# Patient Record
Sex: FEMALE | Race: WHITE | HISPANIC OR LATINO | Employment: FULL TIME | URBAN - METROPOLITAN AREA
[De-identification: names, ages, dates, MRNs, and addresses within clinical notes are randomized per-mention and may not be internally consistent; named-entity substitution may affect disease eponyms.]

---

## 2017-01-17 ENCOUNTER — GENERIC CONVERSION - ENCOUNTER (OUTPATIENT)
Dept: OTHER | Facility: OTHER | Age: 27
End: 2017-01-17

## 2017-01-17 ENCOUNTER — ALLSCRIPTS OFFICE VISIT (OUTPATIENT)
Dept: PERINATAL CARE | Facility: CLINIC | Age: 27
End: 2017-01-17
Payer: COMMERCIAL

## 2017-01-17 PROCEDURE — 76815 OB US LIMITED FETUS(S): CPT | Performed by: OBSTETRICS & GYNECOLOGY

## 2017-01-19 ENCOUNTER — ALLSCRIPTS OFFICE VISIT (OUTPATIENT)
Dept: OTHER | Facility: OTHER | Age: 27
End: 2017-01-19

## 2017-01-20 ENCOUNTER — ANESTHESIA (OUTPATIENT)
Dept: PERIOP | Facility: HOSPITAL | Age: 27
End: 2017-01-20
Payer: COMMERCIAL

## 2017-01-20 ENCOUNTER — HOSPITAL ENCOUNTER (OUTPATIENT)
Facility: HOSPITAL | Age: 27
Setting detail: OUTPATIENT SURGERY
Discharge: HOME | End: 2017-01-20
Attending: OBSTETRICS & GYNECOLOGY | Admitting: OBSTETRICS & GYNECOLOGY
Payer: COMMERCIAL

## 2017-01-20 ENCOUNTER — ANESTHESIA EVENT (OUTPATIENT)
Dept: PERIOP | Facility: HOSPITAL | Age: 27
End: 2017-01-20
Payer: COMMERCIAL

## 2017-01-20 VITALS
HEART RATE: 84 BPM | SYSTOLIC BLOOD PRESSURE: 102 MMHG | BODY MASS INDEX: 33.65 KG/M2 | DIASTOLIC BLOOD PRESSURE: 53 MMHG | HEIGHT: 68 IN | RESPIRATION RATE: 18 BRPM | OXYGEN SATURATION: 99 % | TEMPERATURE: 100.3 F | WEIGHT: 222 LBS

## 2017-01-20 LAB
ABO GROUP BLD: NORMAL
BASOPHILS # BLD AUTO: 0.01 THOUSANDS/ΜL (ref 0–0.1)
BASOPHILS NFR BLD AUTO: 0 % (ref 0–1)
BLD GP AB SCN SERPL QL: NEGATIVE
EOSINOPHIL # BLD AUTO: 0.14 THOUSAND/ΜL (ref 0–0.61)
EOSINOPHIL NFR BLD AUTO: 2 % (ref 0–6)
ERYTHROCYTE [DISTWIDTH] IN BLOOD BY AUTOMATED COUNT: 15.3 % (ref 11.6–15.1)
HCT VFR BLD AUTO: 37.2 % (ref 34.8–46.1)
HGB BLD-MCNC: 12.5 G/DL (ref 11.5–15.4)
LYMPHOCYTES # BLD AUTO: 2.15 THOUSANDS/ΜL (ref 0.6–4.47)
LYMPHOCYTES NFR BLD AUTO: 25 % (ref 14–44)
MCH RBC QN AUTO: 29.6 PG (ref 26.8–34.3)
MCHC RBC AUTO-ENTMCNC: 33.6 G/DL (ref 31.4–37.4)
MCV RBC AUTO: 88 FL (ref 82–98)
MONOCYTES # BLD AUTO: 0.49 THOUSAND/ΜL (ref 0.17–1.22)
MONOCYTES NFR BLD AUTO: 6 % (ref 4–12)
NEUTROPHILS # BLD AUTO: 5.85 THOUSANDS/ΜL (ref 1.85–7.62)
NEUTS SEG NFR BLD AUTO: 67 % (ref 43–75)
NRBC BLD AUTO-RTO: 0 /100 WBCS
PLATELET # BLD AUTO: 225 THOUSANDS/UL (ref 149–390)
PMV BLD AUTO: 10.3 FL (ref 8.9–12.7)
RBC # BLD AUTO: 4.22 MILLION/UL (ref 3.81–5.12)
RH BLD: POSITIVE
WBC # BLD AUTO: 8.65 THOUSAND/UL (ref 4.31–10.16)

## 2017-01-20 PROCEDURE — 86900 BLOOD TYPING SEROLOGIC ABO: CPT | Performed by: OBSTETRICS & GYNECOLOGY

## 2017-01-20 PROCEDURE — 86901 BLOOD TYPING SEROLOGIC RH(D): CPT | Performed by: OBSTETRICS & GYNECOLOGY

## 2017-01-20 PROCEDURE — 86850 RBC ANTIBODY SCREEN: CPT | Performed by: OBSTETRICS & GYNECOLOGY

## 2017-01-20 PROCEDURE — 86920 COMPATIBILITY TEST SPIN: CPT

## 2017-01-20 PROCEDURE — 88305 TISSUE EXAM BY PATHOLOGIST: CPT | Performed by: OBSTETRICS & GYNECOLOGY

## 2017-01-20 PROCEDURE — 85025 COMPLETE CBC W/AUTO DIFF WBC: CPT | Performed by: OBSTETRICS & GYNECOLOGY

## 2017-01-20 RX ORDER — DOXYCYCLINE HYCLATE 100 MG/1
200 CAPSULE ORAL ONCE
Status: DISCONTINUED | OUTPATIENT
Start: 2017-01-20 | End: 2017-01-20

## 2017-01-20 RX ORDER — DOXYCYCLINE HYCLATE 100 MG/1
100 CAPSULE ORAL ONCE
Status: DISCONTINUED | OUTPATIENT
Start: 2017-01-20 | End: 2017-01-20

## 2017-01-20 RX ORDER — MISOPROSTOL 100 UG/1
TABLET ORAL AS NEEDED
Status: DISCONTINUED | OUTPATIENT
Start: 2017-01-20 | End: 2017-01-20 | Stop reason: HOSPADM

## 2017-01-20 RX ORDER — DOXYCYCLINE HYCLATE 100 MG/1
100 CAPSULE ORAL ONCE
Status: COMPLETED | OUTPATIENT
Start: 2017-01-20 | End: 2017-01-20

## 2017-01-20 RX ORDER — FENTANYL CITRATE/PF 50 MCG/ML
25 SYRINGE (ML) INJECTION
Status: DISCONTINUED | OUTPATIENT
Start: 2017-01-20 | End: 2017-01-20 | Stop reason: HOSPADM

## 2017-01-20 RX ORDER — OXYCODONE HYDROCHLORIDE AND ACETAMINOPHEN 5; 325 MG/1; MG/1
1 TABLET ORAL EVERY 4 HOURS PRN
Status: DISCONTINUED | OUTPATIENT
Start: 2017-01-20 | End: 2017-01-20 | Stop reason: HOSPADM

## 2017-01-20 RX ORDER — DOXYCYCLINE HYCLATE 100 MG/1
100 CAPSULE ORAL EVERY 12 HOURS SCHEDULED
Status: DISCONTINUED | OUTPATIENT
Start: 2017-01-20 | End: 2017-01-20 | Stop reason: SDUPTHER

## 2017-01-20 RX ORDER — MIDAZOLAM HYDROCHLORIDE 1 MG/ML
INJECTION INTRAMUSCULAR; INTRAVENOUS AS NEEDED
Status: DISCONTINUED | OUTPATIENT
Start: 2017-01-20 | End: 2017-01-20 | Stop reason: SURG

## 2017-01-20 RX ORDER — SODIUM CHLORIDE, SODIUM LACTATE, POTASSIUM CHLORIDE, CALCIUM CHLORIDE 600; 310; 30; 20 MG/100ML; MG/100ML; MG/100ML; MG/100ML
125 INJECTION, SOLUTION INTRAVENOUS CONTINUOUS
Status: DISCONTINUED | OUTPATIENT
Start: 2017-01-20 | End: 2017-01-20 | Stop reason: HOSPADM

## 2017-01-20 RX ORDER — PROPOFOL 10 MG/ML
INJECTION, EMULSION INTRAVENOUS AS NEEDED
Status: DISCONTINUED | OUTPATIENT
Start: 2017-01-20 | End: 2017-01-20 | Stop reason: SURG

## 2017-01-20 RX ORDER — IBUPROFEN 600 MG/1
600 TABLET ORAL EVERY 6 HOURS PRN
Status: DISCONTINUED | OUTPATIENT
Start: 2017-01-20 | End: 2017-01-20 | Stop reason: HOSPADM

## 2017-01-20 RX ORDER — DIPHENHYDRAMINE HYDROCHLORIDE 50 MG/ML
12.5 INJECTION INTRAMUSCULAR; INTRAVENOUS
Status: DISCONTINUED | OUTPATIENT
Start: 2017-01-20 | End: 2017-01-20 | Stop reason: HOSPADM

## 2017-01-20 RX ORDER — LIDOCAINE HYDROCHLORIDE 10 MG/ML
INJECTION, SOLUTION INFILTRATION; PERINEURAL AS NEEDED
Status: DISCONTINUED | OUTPATIENT
Start: 2017-01-20 | End: 2017-01-20 | Stop reason: SURG

## 2017-01-20 RX ORDER — ACETAMINOPHEN 325 MG/1
650 TABLET ORAL EVERY 6 HOURS PRN
Status: DISCONTINUED | OUTPATIENT
Start: 2017-01-20 | End: 2017-01-20 | Stop reason: HOSPADM

## 2017-01-20 RX ORDER — DOXYCYCLINE HYCLATE 100 MG/1
200 CAPSULE ORAL ONCE
Status: COMPLETED | OUTPATIENT
Start: 2017-01-20 | End: 2017-01-20

## 2017-01-20 RX ORDER — KETOROLAC TROMETHAMINE 30 MG/ML
INJECTION, SOLUTION INTRAMUSCULAR; INTRAVENOUS AS NEEDED
Status: DISCONTINUED | OUTPATIENT
Start: 2017-01-20 | End: 2017-01-20 | Stop reason: SURG

## 2017-01-20 RX ORDER — ONDANSETRON 2 MG/ML
INJECTION INTRAMUSCULAR; INTRAVENOUS AS NEEDED
Status: DISCONTINUED | OUTPATIENT
Start: 2017-01-20 | End: 2017-01-20 | Stop reason: SURG

## 2017-01-20 RX ORDER — ONDANSETRON 2 MG/ML
4 INJECTION INTRAMUSCULAR; INTRAVENOUS ONCE
Status: DISCONTINUED | OUTPATIENT
Start: 2017-01-20 | End: 2017-01-20 | Stop reason: HOSPADM

## 2017-01-20 RX ORDER — FENTANYL CITRATE 50 UG/ML
INJECTION, SOLUTION INTRAMUSCULAR; INTRAVENOUS AS NEEDED
Status: DISCONTINUED | OUTPATIENT
Start: 2017-01-20 | End: 2017-01-20 | Stop reason: SURG

## 2017-01-20 RX ORDER — OXYCODONE HYDROCHLORIDE AND ACETAMINOPHEN 5; 325 MG/1; MG/1
2 TABLET ORAL EVERY 4 HOURS PRN
Status: DISCONTINUED | OUTPATIENT
Start: 2017-01-20 | End: 2017-01-20 | Stop reason: HOSPADM

## 2017-01-20 RX ORDER — LORAZEPAM 2 MG/ML
0.5 INJECTION INTRAMUSCULAR
Status: DISCONTINUED | OUTPATIENT
Start: 2017-01-20 | End: 2017-01-20 | Stop reason: HOSPADM

## 2017-01-20 RX ORDER — ONDANSETRON 2 MG/ML
4 INJECTION INTRAMUSCULAR; INTRAVENOUS
Status: DISCONTINUED | OUTPATIENT
Start: 2017-01-20 | End: 2017-01-20 | Stop reason: HOSPADM

## 2017-01-20 RX ORDER — ONDANSETRON 2 MG/ML
4 INJECTION INTRAMUSCULAR; INTRAVENOUS EVERY 6 HOURS PRN
Status: DISCONTINUED | OUTPATIENT
Start: 2017-01-20 | End: 2017-01-20 | Stop reason: HOSPADM

## 2017-01-20 RX ADMIN — SODIUM CHLORIDE, SODIUM LACTATE, POTASSIUM CHLORIDE, AND CALCIUM CHLORIDE 125 ML/HR: .6; .31; .03; .02 INJECTION, SOLUTION INTRAVENOUS at 08:29

## 2017-01-20 RX ADMIN — SODIUM CHLORIDE, SODIUM LACTATE, POTASSIUM CHLORIDE, AND CALCIUM CHLORIDE 125 ML/HR: .6; .31; .03; .02 INJECTION, SOLUTION INTRAVENOUS at 10:44

## 2017-01-20 RX ADMIN — MIDAZOLAM HYDROCHLORIDE 2 MG: 1 INJECTION, SOLUTION INTRAMUSCULAR; INTRAVENOUS at 08:43

## 2017-01-20 RX ADMIN — DOXYCYCLINE HYCLATE 100 MG: 100 CAPSULE, GELATIN COATED ORAL at 08:13

## 2017-01-20 RX ADMIN — FENTANYL CITRATE 25 MCG: 50 INJECTION INTRAMUSCULAR; INTRAVENOUS at 09:47

## 2017-01-20 RX ADMIN — ONDANSETRON 4 MG: 2 INJECTION INTRAMUSCULAR; INTRAVENOUS at 09:08

## 2017-01-20 RX ADMIN — OXYTOCIN 10 UNITS: 10 INJECTION, SOLUTION INTRAMUSCULAR; INTRAVENOUS at 09:17

## 2017-01-20 RX ADMIN — FENTANYL CITRATE 25 MCG: 50 INJECTION INTRAMUSCULAR; INTRAVENOUS at 09:58

## 2017-01-20 RX ADMIN — KETOROLAC TROMETHAMINE 30 MG: 30 INJECTION, SOLUTION INTRAMUSCULAR at 09:09

## 2017-01-20 RX ADMIN — FENTANYL CITRATE 50 MCG: 50 INJECTION, SOLUTION INTRAMUSCULAR; INTRAVENOUS at 09:00

## 2017-01-20 RX ADMIN — DOXYCYCLINE HYCLATE 200 MG: 100 CAPSULE, GELATIN COATED ORAL at 12:54

## 2017-01-20 RX ADMIN — ONDANSETRON 4 MG: 2 INJECTION INTRAMUSCULAR; INTRAVENOUS at 10:41

## 2017-01-20 RX ADMIN — FENTANYL CITRATE 50 MCG: 50 INJECTION, SOLUTION INTRAMUSCULAR; INTRAVENOUS at 08:50

## 2017-01-20 RX ADMIN — FENTANYL CITRATE 25 MCG: 50 INJECTION INTRAMUSCULAR; INTRAVENOUS at 09:52

## 2017-01-20 RX ADMIN — DEXAMETHASONE SODIUM PHOSPHATE 10 MG: 10 INJECTION INTRAMUSCULAR; INTRAVENOUS at 08:49

## 2017-01-20 RX ADMIN — OXYCODONE HYDROCHLORIDE AND ACETAMINOPHEN 1 TABLET: 5; 325 TABLET ORAL at 11:42

## 2017-01-20 RX ADMIN — FENTANYL CITRATE 25 MCG: 50 INJECTION INTRAMUSCULAR; INTRAVENOUS at 10:05

## 2017-01-20 RX ADMIN — LIDOCAINE HYDROCHLORIDE 50 MG: 10 INJECTION, SOLUTION INFILTRATION; PERINEURAL at 08:49

## 2017-01-20 RX ADMIN — PROPOFOL 200 MG: 10 INJECTION, EMULSION INTRAVENOUS at 08:49

## 2017-01-20 RX ADMIN — IBUPROFEN 600 MG: 600 TABLET ORAL at 12:55

## 2017-01-23 ENCOUNTER — ALLSCRIPTS OFFICE VISIT (OUTPATIENT)
Dept: OTHER | Facility: OTHER | Age: 27
End: 2017-01-23

## 2017-01-23 LAB
ABO GROUP BLD BPU: NORMAL
ABO GROUP BLD BPU: NORMAL
BPU ID: NORMAL
BPU ID: NORMAL
CROSSMATCH: NORMAL
CROSSMATCH: NORMAL
UNIT DISPENSE STATUS: NORMAL
UNIT DISPENSE STATUS: NORMAL
UNIT PRODUCT CODE: NORMAL
UNIT PRODUCT CODE: NORMAL
UNIT RH: NORMAL
UNIT RH: NORMAL

## 2017-01-26 ENCOUNTER — ALLSCRIPTS OFFICE VISIT (OUTPATIENT)
Dept: OTHER | Facility: OTHER | Age: 27
End: 2017-01-26

## 2017-02-14 ENCOUNTER — ALLSCRIPTS OFFICE VISIT (OUTPATIENT)
Dept: OTHER | Facility: OTHER | Age: 27
End: 2017-02-14

## 2017-10-19 ENCOUNTER — APPOINTMENT (EMERGENCY)
Dept: ULTRASOUND IMAGING | Facility: HOSPITAL | Age: 27
End: 2017-10-19
Payer: COMMERCIAL

## 2017-10-19 ENCOUNTER — HOSPITAL ENCOUNTER (EMERGENCY)
Facility: HOSPITAL | Age: 27
Discharge: HOME | End: 2017-10-19
Attending: EMERGENCY MEDICINE
Payer: COMMERCIAL

## 2017-10-19 VITALS
DIASTOLIC BLOOD PRESSURE: 65 MMHG | HEART RATE: 88 BPM | SYSTOLIC BLOOD PRESSURE: 128 MMHG | TEMPERATURE: 97.5 F | OXYGEN SATURATION: 98 % | RESPIRATION RATE: 16 BRPM

## 2017-10-19 DIAGNOSIS — O36.80X0 PREGNANCY OF UNKNOWN ANATOMIC LOCATION: Primary | ICD-10-CM

## 2017-10-19 DIAGNOSIS — O00.202 LEFT OVARIAN PREGNANCY WITHOUT INTRAUTERINE PREGNANCY: ICD-10-CM

## 2017-10-19 PROBLEM — N93.9 VAGINAL BLEEDING: Status: ACTIVE | Noted: 2017-10-19

## 2017-10-19 LAB
ABO GROUP BLD: NORMAL
B-HCG SERPL-ACNC: 521.3 MIU/ML
BACTERIA UR QL AUTO: ABNORMAL /HPF
BASOPHILS # BLD AUTO: 0.01 THOUSANDS/ΜL (ref 0–0.1)
BASOPHILS NFR BLD AUTO: 0 % (ref 0–1)
BILIRUB UR QL STRIP: ABNORMAL
BLD GP AB SCN SERPL QL: NEGATIVE
CHLAMYDIA DNA CVX QL NAA+PROBE: NORMAL
CLARITY UR: ABNORMAL
COLOR UR: ABNORMAL
EOSINOPHIL # BLD AUTO: 0.09 THOUSAND/ΜL (ref 0–0.61)
EOSINOPHIL NFR BLD AUTO: 1 % (ref 0–6)
ERYTHROCYTE [DISTWIDTH] IN BLOOD BY AUTOMATED COUNT: 14.4 % (ref 11.6–15.1)
EXT PREG TEST URINE: NORMAL
GLUCOSE UR STRIP-MCNC: ABNORMAL MG/DL
HCT VFR BLD AUTO: 36.6 % (ref 34.8–46.1)
HGB BLD-MCNC: 12 G/DL (ref 11.5–15.4)
HGB UR QL STRIP.AUTO: ABNORMAL
KETONES UR STRIP-MCNC: ABNORMAL MG/DL
LEUKOCYTE ESTERASE UR QL STRIP: NEGATIVE
LYMPHOCYTES # BLD AUTO: 2.01 THOUSANDS/ΜL (ref 0.6–4.47)
LYMPHOCYTES NFR BLD AUTO: 23 % (ref 14–44)
MCH RBC QN AUTO: 29.1 PG (ref 26.8–34.3)
MCHC RBC AUTO-ENTMCNC: 32.8 G/DL (ref 31.4–37.4)
MCV RBC AUTO: 89 FL (ref 82–98)
MONOCYTES # BLD AUTO: 0.5 THOUSAND/ΜL (ref 0.17–1.22)
MONOCYTES NFR BLD AUTO: 6 % (ref 4–12)
N GONORRHOEA DNA GENITAL QL NAA+PROBE: NORMAL
NEUTROPHILS # BLD AUTO: 6.01 THOUSANDS/ΜL (ref 1.85–7.62)
NEUTS SEG NFR BLD AUTO: 70 % (ref 43–75)
NITRITE UR QL STRIP: NEGATIVE
NON-SQ EPI CELLS URNS QL MICRO: ABNORMAL /HPF
NRBC BLD AUTO-RTO: 0 /100 WBCS
PH UR STRIP.AUTO: 8.5 [PH] (ref 4.5–8)
PLATELET # BLD AUTO: 224 THOUSANDS/UL (ref 149–390)
PMV BLD AUTO: 10.4 FL (ref 8.9–12.7)
PROT UR STRIP-MCNC: ABNORMAL MG/DL
RBC # BLD AUTO: 4.12 MILLION/UL (ref 3.81–5.12)
RBC #/AREA URNS AUTO: ABNORMAL /HPF
RH BLD: POSITIVE
SP GR UR STRIP.AUTO: 1.02 (ref 1–1.03)
SPECIMEN EXPIRATION DATE: NORMAL
UROBILINOGEN UR QL STRIP.AUTO: >=8 E.U./DL
WBC # BLD AUTO: 8.62 THOUSAND/UL (ref 4.31–10.16)
WBC #/AREA URNS AUTO: ABNORMAL /HPF

## 2017-10-19 PROCEDURE — 86901 BLOOD TYPING SEROLOGIC RH(D): CPT | Performed by: EMERGENCY MEDICINE

## 2017-10-19 PROCEDURE — 81001 URINALYSIS AUTO W/SCOPE: CPT

## 2017-10-19 PROCEDURE — 87491 CHLMYD TRACH DNA AMP PROBE: CPT | Performed by: EMERGENCY MEDICINE

## 2017-10-19 PROCEDURE — 85025 COMPLETE CBC W/AUTO DIFF WBC: CPT | Performed by: EMERGENCY MEDICINE

## 2017-10-19 PROCEDURE — 36415 COLL VENOUS BLD VENIPUNCTURE: CPT | Performed by: EMERGENCY MEDICINE

## 2017-10-19 PROCEDURE — 86850 RBC ANTIBODY SCREEN: CPT | Performed by: EMERGENCY MEDICINE

## 2017-10-19 PROCEDURE — 76815 OB US LIMITED FETUS(S): CPT

## 2017-10-19 PROCEDURE — 86900 BLOOD TYPING SEROLOGIC ABO: CPT | Performed by: EMERGENCY MEDICINE

## 2017-10-19 PROCEDURE — 87591 N.GONORRHOEAE DNA AMP PROB: CPT | Performed by: EMERGENCY MEDICINE

## 2017-10-19 PROCEDURE — 81002 URINALYSIS NONAUTO W/O SCOPE: CPT | Performed by: EMERGENCY MEDICINE

## 2017-10-19 PROCEDURE — 81025 URINE PREGNANCY TEST: CPT | Performed by: EMERGENCY MEDICINE

## 2017-10-19 PROCEDURE — 84702 CHORIONIC GONADOTROPIN TEST: CPT | Performed by: EMERGENCY MEDICINE

## 2017-10-19 PROCEDURE — 99284 EMERGENCY DEPT VISIT MOD MDM: CPT

## 2017-10-19 NOTE — DISCHARGE INSTRUCTIONS
Please return to [facility]  ED to have an HCG quant drawn  Please refrain from eating anything after midnight on Saturday  Precautions/Reasons to return to the ED:   -- Severe abdominal pain  -- Heavy vaginal bleeding, saturating more than 1-2 pads per hour  -- Loss of consciousness, dizziness, lightheadness    For information regarding terminating pregancy:  Belen Jay Jorgensen  Phone: [phone]  Address: [address] [place] [address] [address]  [address]    Ectopic Pregnancy   WHAT YOU NEED TO KNOW:   What is an ectopic pregnancy? Ectopic pregnancy occurs when a fertilized egg attaches and begins to grow outside of the uterus  The most common place for this to happen is in the fallopian tube  This is sometimes called a tubal pregnancy  The egg can also implant on the outside of the uterus, on the ovary or cervix, or in the abdomen  The egg may begin to grow, but the pregnancy cannot continue normally  Ectopic pregnancy can cause heavy bleeding and may be life-threatening  What increases my risk of an ectopic pregnancy? · Pelvic inflammatory disease (PID) or infections, such as chlamydia    · Prior ectopic pregnancy     · Getting pregnant when you have an intrauterine device (IUD)     · Previous surgery in your abdomen or on your reproductive organs    · Medicines to treat infertility or that contain female hormones    · Smoking  What are the signs and symptoms of ectopic pregnancy? You may miss your period  You may have one or more of the following:  · One-sided abdominal or pelvic pain and cramping    · Vaginal bleeding or spotting that happens about 7 weeks after your missed period    · Nausea or vomiting    · Dizziness, weakness, or fainting    · Tissue coming out of your vagina  How is ectopic pregnancy diagnosed? Your healthcare provider will examine you and ask about other medical conditions or surgeries you have had   He will ask about pregnancies, miscarriages, infertility treatments, and sexually transmitted infections (STIs) you have had before  You may need any of the following:  · Pelvic exam:  Your healthcare provider will check the size and shape of your uterus, cervix, and ovaries  · Blood and urine tests: These tests can show if you are currently pregnant, or if you have infections or other problems  · Ultrasound: This uses sound waves to show pictures of the inside of your uterus, ovaries, and abdomen  An ultrasound is usually done over your abdomen, but you may also need a vaginal ultrasound  During a vaginal ultrasound, a small tube is placed into your vagina  This can help healthcare providers see areas that may be hard to see during an abdominal ultrasound  How is ectopic pregnancy treated? Your body may absorb the pregnancy tissues and your symptoms may decrease without any treatment  If this does not happen, you may need any of the following:  · Medicines:  Methotrexate or another medicine may be given to stop the pregnancy  This may be given as an injection  You may need more than one dose of this medicine  · Surgery: This may be done to repair or remove tissue or ruptured fallopian tubes  What are the risks of an ectopic pregnancy? Surgery may cause bleeding or infection  Even after treatment, you may have another ectopic pregnancy or have difficulty getting pregnant in the future  If left untreated, parts of your reproductive system or other organs may get damaged  This may cause infection or severe bleeding, and may become life-threatening  Where can I get more information? · [employer]   [facility]  [address]  Phone: [phone]  Phone: [phone]  Web Address: [email or web address]  [email or web address]  When should I contact my healthcare provider? · You have a fever  · You have questions or concerns about your condition or care  When should I seek immediate care or call 1? · You feel lightheaded or like you are going to faint  · You have increasing abdominal or pelvic pain or heavy vaginal bleeding  · You have shoulder pain  · You have chest pain or trouble breathing  CARE AGREEMENT:   You have the right to help plan your care  Learn about your health condition and how it may be treated  Discuss treatment options with your caregivers to decide what care you want to receive  You always have the right to refuse treatment  The above information is an  only  It is not intended as medical advice for individual conditions or treatments  Talk to your doctor, nurse or pharmacist before following any medical regimen to see if it is safe and effective for you  2017 Chad  Information is for End User's use only and may not be sold, redistributed or otherwise used for commercial purposes  All illustrations and images included in CareNotes® are the copyrighted property of [employer]  or Marco Rivero  Threatened Miscarriage   WHAT YOU NEED TO KNOW:   A threatened miscarriage occurs when you have vaginal bleeding within the first 20 weeks of pregnancy  It means that a miscarriage may happen  A threatened miscarriage may also be called a threatened   DISCHARGE INSTRUCTIONS:   Return to the emergency department if:   · You feel weak or faint  · Your pain or cramping in your abdomen or back gets worse  · You have vaginal bleeding that soaks 1 or more pads in an hour  · You pass material that looks like tissue or large clots  Contact your healthcare provider or obstetrician if:   · You have a fever  · You have trouble urinating, burning when you urinate, or feel a need to urinate often  · You have new or worsening vaginal bleeding  · You have vaginal pain or itching, or vaginal discharge that is yellow, green, or foul-smelling       · You have questions or concerns about your condition or care   Self-care: The following may help you manage your symptoms and decrease your risk for a miscarriage:  · Do not put anything in your vagina  Do not have sex, douche, or use tampons  These actions may increase your risk for infection and miscarriage  · Rest as directed  Do not exercise or do strenuous activities  These activities may cause  labor or miscarriage  Ask your healthcare provider what activities are okay to do  Stay healthy during pregnancy:   · Eat a variety of healthy foods  Healthy foods can help you get extra protein, water, and calories that you need while you are pregnant  Healthy foods include fruits, vegetables, whole-grain breads, low-fat dairy products, beans, lean meats, and fish  Avoid raw or undercooked meat and fish  Ask your healthcare provider if you need a special diet  · Take prenatal vitamins as directed  These help you get the right amount of vitamins and minerals  They may also decrease the risk of certain birth defects  · Do not drink alcohol or use illegal drugs  These can increase your risk for a miscarriage or harm your baby  · Do not smoke  Nicotine and other chemicals in cigarettes and cigars can harm your baby and cause miscarriage or  labor  Ask your healthcare provider for information if you currently smoke and need help to quit  E-cigarettes or smokeless tobacco still contain nicotine  Do not use these products  · Decrease your risk for an infection  Always wash your hands before eating or preparing meals  Do not spend time with people who are sick  Ask your healthcare provider if you need immunizations such as the flu or hepatitis B vaccine  Immunizations may decrease your risk for infections that could cause a miscarriage  · Manage your medical conditions  Keep your blood pressure and blood sugars under control  Maintain a healthy weight during pregnancy  Follow up with your obstetrician as directed:   You may need to see your obstetrician frequently for ultrasounds or blood tests  Write down your questions so you remember to ask them during your visits  © 2017 [address] Information is for End User's use only and may not be sold, redistributed or otherwise used for commercial purposes  All illustrations and images included in CareNotes® are the copyrighted property of [employer]  or Marco Rivero  The above information is an  only  It is not intended as medical advice for individual conditions or treatments  Talk to your doctor, nurse or pharmacist before following any medical regimen to see if it is safe and effective for you

## 2017-10-19 NOTE — ED PROVIDER NOTES
History  Chief Complaint   Patient presents with    Vaginal Bleeding      had +pregnancy test  Reports passed clot on the   Patient reports bleeding stopped after that and came back on , reports brown colored bleeding with mid/left side abdominal pain  26-year-old female presents to the emergency department complaining of vaginal bleeding and suprapubic to left lower quadrant abdominal pain  This has been ongoing for the last 2 days  Patient states that she had had a period since the end of August   She took a pregnancy test on  and it was positive  She states she then had vaginal bleeding and passed a clot on   The bleeding resolved and then resumed again on   She states that the bleeding is lighter than her usual period  She does have a prior history of miscarriages in the past but no ectopic pregnancies  She has had no fevers or chills, vaginal discharge          History provided by:  Patient   used: No    Pelvic Pain   Severity:  Moderate  Onset quality:  Gradual  Duration:  2 days  Timing:  Constant  Progression:  Waxing and waning  Chronicity:  New  Associated symptoms: abdominal pain    Associated symptoms: no chest pain, no congestion, no cough, no diarrhea, no ear pain, no fatigue, no fever, no headaches, no myalgias, no nausea, no rhinorrhea, no shortness of breath, no sore throat, no vomiting and no wheezing    Vaginal Bleeding   Quality:  Dark red and lighter than menses  Severity:  Moderate  Onset quality:  Gradual  Duration:  7 days  Timing:  Constant  Chronicity:  New  Menstrual history:  Missed period  Possible pregnancy: yes    Context: spontaneously    Associated symptoms: abdominal pain    Associated symptoms: no back pain, no dizziness, no dysuria, no fatigue, no fever, no nausea and no vaginal discharge        None       Past Medical History:   Diagnosis Date    Missed      twin pregnancy       Past Surgical History:   Procedure Laterality Date     SECTION      DILATION AND EVACUATION N/A 2017    Procedure: DILATATION AND EVACUATION (D&E); Surgeon: Cam Sood MD;  Location: [facility];  Service:        No family history on file  I have reviewed and agree with the history as documented  Social History   Substance Use Topics    Smoking status: Former Smoker    Smokeless tobacco: Not on file    Alcohol use No      Comment: Pt  states she drinks 1 drink/week        Review of Systems   Constitutional: Negative  Negative for chills, diaphoresis, fatigue and fever  HENT: Negative  Negative for congestion, ear pain, rhinorrhea and sore throat  Eyes: Negative  Negative for discharge, redness and itching  Respiratory: Negative  Negative for apnea, cough, chest tightness, shortness of breath and wheezing  Cardiovascular: Negative for chest pain, palpitations and leg swelling  Gastrointestinal: Positive for abdominal pain  Negative for abdominal distention, blood in stool, diarrhea, nausea and vomiting  Endocrine: Negative  Genitourinary: Positive for pelvic pain and vaginal bleeding  Negative for dysuria, flank pain, frequency, urgency and vaginal discharge  Musculoskeletal: Negative  Negative for back pain and myalgias  Skin: Negative  Allergic/Immunologic: Negative  Neurological: Negative  Negative for dizziness, syncope, weakness, light-headedness, numbness and headaches  Hematological: Negative  All other systems reviewed and are negative  Physical Exam  ED Triage Vitals [10/19/17 1235]   Temperature Pulse Respirations Blood Pressure SpO2   97 5 °F (36 4 °C) 91 16 130/76 96 %      Temp Source Heart Rate Source Patient Position - Orthostatic VS BP Location FiO2 (%)   Temporal Monitor Sitting Right arm --      Pain Score       5           Physical Exam   Constitutional: She is oriented to person, place, and time   She appears well-developed and well-nourished  Non-toxic appearance  She does not have a sickly appearance  She does not appear ill  No distress  HENT:   Head: Normocephalic and atraumatic  Right Ear: External ear normal    Left Ear: External ear normal    Mouth/Throat: Oropharynx is clear and moist    Eyes: Conjunctivae are normal  Pupils are equal, round, and reactive to light  Right eye exhibits no discharge  Left eye exhibits no discharge  No scleral icterus  Cardiovascular: Normal rate, regular rhythm and normal heart sounds  Exam reveals no gallop and no friction rub  No murmur heard  Pulmonary/Chest: Effort normal and breath sounds normal  No respiratory distress  She has no wheezes  She has no rales  She exhibits no tenderness  Abdominal: Soft  Normal appearance and bowel sounds are normal  She exhibits no distension and no mass  There is tenderness in the suprapubic area and left lower quadrant  There is no rebound and no guarding  No hernia  Neurological: She is alert and oriented to person, place, and time  She has normal reflexes  She exhibits normal muscle tone  Skin: Skin is warm and dry  No rash noted  She is not diaphoretic  No erythema  No pallor  Psychiatric: She has a normal mood and affect  Nursing note and vitals reviewed        ED Medications  Medications - No data to display    Diagnostic Studies  Labs Reviewed   URINE MICROSCOPIC - Abnormal        Result Value Ref Range Status    RBC, UA Innumerable (*) None Seen, 0-5 /hpf Final    Epithelial Cells Field obscured, unable to enumerate (*) None Seen, Occasional /hpf Final    Bacteria, UA Field obscured, unable to enumerate (*) None Seen, Occasional /hpf Final    WBC, UA None Seen  None Seen, 0-5, 5-55, 5-65 /hpf Final   POCT URINALYSIS DIPSTICK - Abnormal    ED URINE MACROSCOPIC - Abnormal     pH, UA 8 5 (*) 4 5 - 8 0 Final    Protein,  (2+) (*) Negative mg/dl Final    Glucose,  (1/10%) (*) Negative mg/dl Final    Ketones, UA 80 (3+) (*) Negative mg/dl Final    Urobilinogen, UA >=8 0 (*) 0 2, 1 0 E U /dl E U /dl Final    Bilirubin, UA Interference- unable to analyze (*) Negative Final    Comment: The dipstick result may be falsely positive do to interfering substances  We recommend reliance upon serum bilirubin, liver & kidney function tests to guide patient care if clinically indicated      Blood, UA Large (*) Negative Final    Color, UA Bloody   Final    Clarity, UA Slightly Cloudy   Final    Leukocytes, UA Negative  Negative Final    Nitrite, UA Negative  Negative Final    Specific Gravity, UA 1 020  1 003 - 1 030 Final    Narrative:     CLINITEK RESULT   CBC AND DIFFERENTIAL - Normal    WBC 8 62  4 31 - 10 16 Thousand/uL Final    RBC 4 12  3 81 - 5 12 Million/uL Final    Hemoglobin 12 0  11 5 - 15 4 g/dL Final    Hematocrit 36 6  34 8 - 46 1 % Final    MCV 89  82 - 98 fL Final    MCH 29 1  26 8 - 34 3 pg Final    MCHC 32 8  31 4 - 37 4 g/dL Final    RDW 14 4  11 6 - 15 1 % Final    MPV 10 4  8 9 - 12 7 fL Final    Platelets 506  636 - 390 Thousands/uL Final    nRBC 0  /100 WBCs Final    Neutrophils Relative 70  43 - 75 % Final    Lymphocytes Relative 23  14 - 44 % Final    Monocytes Relative 6  4 - 12 % Final    Eosinophils Relative 1  0 - 6 % Final    Basophils Relative 0  0 - 1 % Final    Neutrophils Absolute 6 01  1 85 - 7 62 Thousands/µL Final    Lymphocytes Absolute 2 01  0 60 - 4 47 Thousands/µL Final    Monocytes Absolute 0 50  0 17 - 1 22 Thousand/µL Final    Eosinophils Absolute 0 09  0 00 - 0 61 Thousand/µL Final    Basophils Absolute 0 01  0 00 - 0 10 Thousands/µL Final   POCT PREGNANCY, URINE - Normal    EXT PREG TEST UR (Ref: Negative) POSS ( + )   Corrected   CHLAMYDIA /GC AMPLIFIED DNA   HCG, QUANTITATIVE   TYPE AND SCREEN       US pelvis complete    (Results Pending)       Procedures  Procedures      Phone Contacts  ED Phone Contact    ED Course  ED Course                                MDM  Number of Diagnoses or Management Options  Diagnosis management comments: 80-year-old female presents with vaginal bleeding which has been off and on since October 10th  She had a positive home pregnancy test on October 5th  She has now been having lower abdominal pain for the last couple of days  On exam she is awake and alert and in no distress  Pregnancy test here was positive  She does have some tenderness to the suprapubic and left lower quadrant without rebound or guarding  Will do HCG quants, type and screen, will ultrasound to rule out ectatic pregnancy       Amount and/or Complexity of Data Reviewed  Clinical lab tests: ordered and reviewed  Tests in the radiology section of CPT®: ordered and reviewed  Independent visualization of images, tracings, or specimens: yes      CritCare Time    Disposition  Final diagnoses:   None     ED Disposition     None      Follow-up Information    None       Patient's Medications   Discharge Prescriptions    No medications on file     No discharge procedures on file      ED Provider  Electronically Signed by       Matt Guerrier DO  10/20/17 1648

## 2017-10-19 NOTE — CONSULTS
Gyn Consult Note   José Louie 32 y o  female MRN: [id number]  Unit/Bed#: ED 10 Encounter: [id number]      Assessment:  43-year-old  013 with pregnancy of unknown location    Plan:  1  Pregnancy of unknown location   Patient currently hemodynamically stable  This is an undesired pregnancy  The patient is currently comfortable and has not required pain medication while in the emergency room  Her bleeding is like a light period  H/H wnl at 12/36  Rh pos  Discussed with patient that this could be early failing IUP vs early ectopic  Given that she is stable and pain is well-controlled, discussed will need serial b-hcg for more information at this time  Patient instructed to return to the ED on , 10/21, for repeat HCG quant  If HCG quant decreases, it is likely patient is experiencing spontaneous  and we can offer misoprostol for passage of POC  If HCG quant increases appropriately patient could have a viable pregnancy  If this is to occur patient has been given information for [facility] regarding termination  If HCG quant plateaus, patient should be examined and possibly undergo diagnostic laparoscopy to rule out ectopic pregnancy  Patient was instructed to have labs drawn in the morning on  and to refrain from eating after midnight on Saturday in the event that she may need a diagnostic laparoscopy  Patient was given precautions regarding complications of ectopic pregnancy especially regarding rupture of ectopic pregnancy  Precautions include severe abdominal pain, nausea vomiting, loss of consciousness, dizziness, lightheadedness  Patient understands that she should return to the emergency room and call the on-call OBGYN resident if the symptoms were to occur  Pain:  Over-the-counter Tylenol and ibuprofen  Diet:  Regular diet    Dispo:  Discharge home today    Patient is plan was discussed with Dr Ash Kern MD  OBGYN, PGY3  10/19/2017 6:22 PM      Attending note  I have seen the patient and agree with the note with direct modifications made  Briefly, 32 y o  [id number] with early pregnancy of unk location  Hemodynamically stable, low suspicion for intraabdominal bleed  Given low hcg and benign abdominal exam with bleeding, suspect early SAB; however, because of the questionable soft tissue mass in adnexa cannot exclude early ectopic  Emphasized importance of f/u b-hcg in 48 hours  Pt understands and agrees with plan  Phone numbers obtained for both patient and her boyfriend (given permission to discuss medical care with her significant other)  Brooklyn Soto MD    HPI:  Adriel Simpson is a 41-year-old  013 pregnancy of unk gestation (uncertain LMP, maybe 2017) who presented to the emergency department complaining of vaginal bleeding and left lower quadrant abdominal pain  Patient reports vaginal bleeding started on  at which time she passed some small clots  Vaginal bleeding resolved and then resumed on   She states that she is having vaginal bleeding today that is similar to her menstrual periods  Patient reports the pain worsened acutely 2 days ago at which time she was in the fetal position and fell asleep from crying  Patient states that she has been smoking excessive amounts of marijuana in order to help with her pain  When patient woke she stated the pain was resolved and since then has been intermittent and describes the pain as achy and sore  Patient had a positive home pregnancy test on   Her HCG quant today at 13:26 was noted to be 512  Ultrasound was done  No intrauterine pregnancy was visualized  A left adnexal soft tissue mass was noted along with free fluid in her posterior cul-de-sac that was concerning for possible ectopic pregnancy  In discussion regarding pregnancy patient states that this is an unwanted pregnancy   Patient states that she feels as though she is having a miscarriage and her bleeding is similar to miscarriages in the past       OBHx: CS x 1 followed by  x 2,  One likely SAB at home (chemical pregnancy?)    /65   Pulse 88   Temp 97 5 °F (36 4 °C) (Temporal)   Resp 16   LMP 2017 (Exact Date)   SpO2 98%     Physical Exam  Gen: not in acute distress  Alert and oriented  CV: regular rate  Peripheral pulses palpable  Lungs: normal effort of breathing  No tachypnea  Abdomen:  Soft, nondistended  Tender to palpation in left lower quadrant and left inguinal area  :  Bimanual exam performed  Uterus mobile, normal in size and contour  Cervix palpated and closed  Left adnexa tender to palpation abdominally  No masses appreciated bilaterally  No masses appreciated in posterior cul-de-sac  Vaginal bleeding evident on exam, not heavy, no clots observed  Extremities: no edema, nontender      Lab Results   Component Value Date    WBC 8 62 10/19/2017    HGB 12 0 10/19/2017    HCT 36 6 10/19/2017    MCV 89 10/19/2017     10/19/2017       Lab Results   Component Value Date    GLUCOSE 71 2016    CALCIUM 8 9 2016     2016    K 3 6 2016    CO2 25 2016     2016    BUN 6 2016    CREATININE 0 52 (L) 2016     Rh positive    Pelvic US:  PELVIC ULTRASOUND, COMPLETE     INDICATION: Pregnant, bleeding  Left lower quadrant pain                COMPARISON: 2011      TECHNIQUE:   Transabdominal pelvic ultrasound was performed in sagittal and transverse planes with a curvilinear transducer  Additional transvaginal imaging was performed to better evaluate the endometrium and ovaries  Imaging included volumetric   sweeps as well as traditional still imaging technique      FINDINGS:     UTERUS:  The uterus is anteverted in position, measuring 11 0 x 4 2 x 7 5 cm     Contour and echotexture appear normal       The cervix shows no suspicious abnormality      ENDOMETRIUM:    Normal caliber of 4 mm   Homogenous and normal in appearance  No intrauterine pregnancy is identified      OVARIES/ADNEXA:  Right ovary:  3 5 x 1 9 x 1 9 cm  No suspicious right ovarian abnormality  Doppler flow within normal limits      Left ovary: The left ovary has an abnormal appearance with a potential adjacent soft tissue abnormality of measuring 4 2 x 2 8 x 3 2 cm  A moderate amount of complex free fluid is identified within the pelvis  Given these findings a left adnexal ectopic pregnancy is not excluded on the basis of this exam         IMPRESSION:     No intrauterine pregnancy identified  Abnormal appearance of the left ovary and complex pelvic free fluid  Given these findings, the possibility of a left adnexal ectopic pregnancy is not excluded

## 2017-10-22 ENCOUNTER — HOSPITAL ENCOUNTER (EMERGENCY)
Facility: HOSPITAL | Age: 27
Discharge: HOME | End: 2017-10-22
Attending: EMERGENCY MEDICINE
Payer: COMMERCIAL

## 2017-10-22 VITALS
RESPIRATION RATE: 18 BRPM | BODY MASS INDEX: 34.19 KG/M2 | SYSTOLIC BLOOD PRESSURE: 115 MMHG | DIASTOLIC BLOOD PRESSURE: 65 MMHG | WEIGHT: 224.87 LBS | HEART RATE: 100 BPM | OXYGEN SATURATION: 98 % | TEMPERATURE: 98.8 F

## 2017-10-22 DIAGNOSIS — O36.80X0 PREGNANCY OF UNKNOWN ANATOMIC LOCATION: Primary | ICD-10-CM

## 2017-10-22 PROBLEM — O03.9 SPONTANEOUS ABORTION: Status: ACTIVE | Noted: 2017-10-22

## 2017-10-22 LAB — B-HCG SERPL-ACNC: 313.2 MIU/ML

## 2017-10-22 PROCEDURE — 84702 CHORIONIC GONADOTROPIN TEST: CPT | Performed by: EMERGENCY MEDICINE

## 2017-10-22 PROCEDURE — 36415 COLL VENOUS BLD VENIPUNCTURE: CPT | Performed by: EMERGENCY MEDICINE

## 2017-10-22 PROCEDURE — 99283 EMERGENCY DEPT VISIT LOW MDM: CPT

## 2017-10-22 RX ORDER — MISOPROSTOL 200 UG/1
400 TABLET ORAL ONCE
Qty: 1 TABLET | Refills: 0 | Status: SHIPPED | OUTPATIENT
Start: 2017-10-22 | End: 2017-10-22

## 2017-10-22 NOTE — DISCHARGE INSTRUCTIONS
Miscarriage   WHAT YOU NEED TO KNOW:   A miscarriage is the loss of a fetus within the first 20 weeks of pregnancy  A miscarriage may also be called a spontaneous  or an early pregnancy loss  DISCHARGE INSTRUCTIONS:   Seek care immediately if:   · You have foul-smelling drainage or pus coming from your vagina  · You have heavy vaginal bleeding and soak 1 pad or more in an hour  · You have severe abdominal pain  · You feel like your heart is beating faster than normal      · You feel extremely weak or dizzy  Contact your healthcare provider if:   · You have a fever greater than 100 4°F or chills  · You have extreme sadness, grief, or feel unable to cope with what has happened  · You have questions or concerns about your condition or care  Self-care:   · Do not put anything in your vagina for 2 weeks or as directed  Do not use tampons, douche, or have sex  These actions can cause infection and pain  · Use sanitary pads as needed  You may have light bleeding or spotting for 2 weeks  · Do not take a bath or go swimming for 2 weeks or as directed  These actions may increase your risk for an infection  Take showers only  · Rest as needed  Slowly start to do more each day  Return to your daily activities as directed  · Talk to your healthcare provider about birth control  If you would like to prevent another pregnancy, ask your healthcare provider which type of birth control is best for you  · Join a support group or therapy to help you cope  A miscarriage may be very difficult for you, your partner, and other members of your family  There is no right way to feel after a miscarriage  You may feel overwhelming grief or other emotions  It may be helpful to talk to a friend, family member, or counselor about your feelings  You may worry that you could have another miscarriage  Talk to your healthcare provider about your concerns   He may be able to help you reduce the risk for another miscarriage  He may also help you find ways to cope with grief  For more information:   · [employer]  [address] [place] [address] , [address] [place] [address]  Phone: [phone]  Phone: [phone]  Web Address: http://[employer]/  org  · [facility]  [address] [facility] , [address] [place] [address]  Web Address: UNI5  Follow up with your healthcare provider as directed: You may need to see your healthcare provider for blood tests or an ultrasound  Write down your questions so you remember to ask them during your visits  © 2017 [address] [employer]  Information is for End User's use only and may not be sold, redistributed or otherwise used for commercial purposes  All illustrations and images included in CareNotes® are the copyrighted property of [employer]  or [employer]  The above information is an  only  It is not intended as medical advice for individual conditions or treatments  Talk to your doctor, nurse or pharmacist before following any medical regimen to see if it is safe and effective for you

## 2017-10-22 NOTE — H&P
H&P Exam - Gynecology   Alex Grewal 32 y o  female MRN: [id number]  Unit/Bed#: ED 28 Encounter: [id number]    Chief Complaint   Patient presents with    Abdominal Pain Re-Eval     States was seen here x2 days ago for ectopic rule out, told to come back to ED for follow up blood work, states is still having abd and back pain  History of Present Illness     HPI:  Alex Grewal is a 32 y o  female [id number] who was seen in the ED on 10/19/17 for LLQ pain and vaginal bleeding, + HPT  On 10/19 quanitative Bhcg 521, TVUS showed no IUP, notable for left adnexal soft tissue mass and free fluid in the pelvis, hemodynamically stable w/ H&H   She was evaluated by GYN Dr Jose [place] and discharged with follow up in 2 days for evaluation of symptoms and quantitative Bhcg  She presents today reports that she still has mild vaginal bleeding "like a period", not soaking through >1pad/hr, no passage of tissue  LLQ pain has completely resolved, now reports uterine cramping  Denies fever/chills, chest pain, palpitations, SOB  Review of Systems   Constitutional: Negative for chills and fever  Respiratory: Negative for chest tightness and shortness of breath  Cardiovascular: Negative for chest pain and palpitations  Gastrointestinal: Negative for diarrhea, nausea and vomiting  Genitourinary: Positive for vaginal bleeding  Negative for dysuria and flank pain  Neurological: Negative for dizziness and headaches  Historical Information   Past Medical History:   Diagnosis Date    Missed      twin pregnancy     Past Surgical History:   Procedure Laterality Date     SECTION      DILATION AND EVACUATION N/A 2017    Procedure: DILATATION AND EVACUATION (D&E); Surgeon: Albina Pang MD;  Location: [facility];  Service:      OB/GYN History:   C/S x 1,  x 1  SAB x 1    History reviewed  No pertinent family history    Social History   History   Alcohol Use No     Comment: Pt  states she drinks 1 drink/week     History   Drug Use No     History   Smoking Status    Former Smoker   Smokeless Tobacco    Never Used       Meds/Allergies     (Not in a hospital admission)  Allergies   Allergen Reactions    Cat Hair Extract        Objective   Vitals: Blood pressure 115/65, pulse 100, temperature 98 8 °F (37 1 °C), temperature source Oral, resp  rate 18, weight 102 kg (224 lb 13 9 oz), last menstrual period 2017, SpO2 98 %  No intake or output data in the 24 hours ending 10/22/17 1359    Invasive Devices: Invasive Devices          No matching active lines, drains, or airways          Physical Exam   Gen: NAD, AAOx3, Pleasant & Cooperative  Cv: RRR, No M/R/G  Resp: CTAB, No W/R/R  Abd: Soft, nontender, no rebound or guarding, nondistended, bowel sounds +  Gu: Bimanual examination: normal sized & mobile uterus, no CMT, no adnexal tenderness bilaterally  Ext: No edema or tenderness    Lab Results:   Admission on 10/22/2017   Component Date Value    HCG, Quant 10/22/2017 313 2*      Imaging: I have personally reviewed pertinent reports  EKG, Pathology, and Other Studies: I have personally reviewed pertinent reports  Assessment/Plan     Assessment:  31 y/O  w/ SAB  Quant 521--> 313  Vaginal bleeding stable, vitals stable  We discussed expectant management of SAB vs medical management with Cytotec  Patient desires Cytotec  Plan:  Rx for PO Cytotec 400mg  Patient will [facility] up at the [facility] in 1 week for evaluation and repeat quantitative Bhcg  Bleeding & ectopic precautions reviewed    Discussed with Dr Montrell Kern / Coordination of Care  Total floor / unit time spent today30 minutes  minutes  Greater than 50% of total time was spent with the patient and / or family counseling and / or coordination of care       Misti Jolly MD  10/22/2017  1:59 PM

## 2017-10-22 NOTE — ED PROVIDER NOTES
History  Chief Complaint   Patient presents with    Abdominal Pain Re-Eval     States was seen here x2 days ago for ectopic rule out, told to come back to ED for follow up blood work, states is still having abd and back pain   013, unknown LMP (possibly August)  Pt was seen here 3 days ago for an ectopic rule out  Had US that did not confirm IUP and abnormal appearance of the left ovary and complex pelvic free fluid, and ectopic pregnancy could not be excluded  B-quant was 521 on 10/19  Pt was evaluated by OBGYN while in the ER and instructed to return to the ER in 48 hours for a repeat B-quant  Pt did not come in yesterday  Still having the same pain, pt denies worsening pain  Pt states her vaginal bleeding is lighter than what it was when she was here 3 days ago  History provided by:  Patient   used: No    Abdominal Pain   Pain location:  LLQ and suprapubic  Pain quality: cramping    Pain radiates to:  Does not radiate  Timing:  Constant  Progression:  Unchanged  Chronicity:  New  Relieved by:  None tried  Worsened by:  Nothing  Ineffective treatments:  None tried  Associated symptoms: vaginal bleeding    Associated symptoms: no chills, no constipation, no cough, no diarrhea, no dysuria, no fever, no hematuria, no nausea, no vaginal discharge and no vomiting        None       Past Medical History:   Diagnosis Date    Missed      twin pregnancy       Past Surgical History:   Procedure Laterality Date     SECTION      DILATION AND EVACUATION N/A 2017    Procedure: DILATATION AND EVACUATION (D&E); Surgeon: Cam Sood MD;  Location[facility];  Service:        History reviewed  No pertinent family history  I have reviewed and agree with the history as documented      Social History   Substance Use Topics    Smoking status: Former Smoker    Smokeless tobacco: Never Used    Alcohol use No      Comment: Pt  states she drinks 1 drink/week        Review of Systems   Constitutional: Negative for appetite change, chills and fever  HENT: Negative for congestion and rhinorrhea  Respiratory: Negative for cough  Gastrointestinal: Positive for abdominal pain  Negative for abdominal distention, blood in stool, constipation, diarrhea, nausea and vomiting  Genitourinary: Positive for vaginal bleeding  Negative for dysuria, flank pain, frequency, hematuria, urgency and vaginal discharge  Musculoskeletal: Negative for back pain  All other systems reviewed and are negative  Physical Exam  ED Triage Vitals [10/22/17 1113]   Temperature Pulse Respirations Blood Pressure SpO2   98 8 °F (37 1 °C) 99 18 121/70 95 %      Temp Source Heart Rate Source Patient Position - Orthostatic VS BP Location FiO2 (%)   Oral Monitor Sitting Right arm --      Pain Score       6           Physical Exam   Constitutional: She is oriented to person, place, and time  She appears well-developed and well-nourished  Non-toxic appearance  She does not have a sickly appearance  She does not appear ill  No distress  Playing on cell phone   HENT:   Head: Normocephalic  Mouth/Throat: Oropharynx is clear and moist and mucous membranes are normal    Neck: Normal range of motion  Neck supple  Cardiovascular: Normal rate, regular rhythm, normal heart sounds and intact distal pulses  Exam reveals no gallop and no friction rub  No murmur heard  Pulmonary/Chest: Effort normal and breath sounds normal  No respiratory distress  She has no wheezes  She has no rales  Abdominal: Soft  Normal appearance and bowel sounds are normal  She exhibits no distension and no mass  There is no hepatosplenomegaly  There is tenderness in the suprapubic area and left lower quadrant  There is no rigidity, no rebound, no guarding and no CVA tenderness  Lymphadenopathy:     She has no cervical adenopathy  Neurological: She is alert and oriented to person, place, and time     Skin: Skin is warm, dry and intact  No rash noted  No pallor  Nursing note and vitals reviewed  ED Medications  Medications - No data to display    Diagnostic Studies  Labs Reviewed   HCG, QUANTITATIVE - Abnormal        Result Value Ref Range Status    HCG, Quant 313 2 (*) <=6 mIU/mL Final    Narrative:      Expected Ranges:     Approximate               Approximate HCG  Gestation age          Concentration ( mIU/mL)  _____________          ______________________   Marshel Dura                      HCG values  0 2-1                       5-50  1-2                           2-3                         100-5000  3-4                         500-02653  4-5                         1000-67066  5-6                         [id number]  6-8                         [id number]  8-12                        [id number]   CBC AND DIFFERENTIAL       No orders to display       Procedures  Procedures      Phone Contacts  ED Phone Contact    ED Course  ED Course as of Oct 22 1453   Sun Oct 22, 2017   1216 OBESPERANZAN paged    21  Discussed case with OBGYN resident Jonathan Castañeda who will come see pt     5479 Pt seen and discharged by OBGYN  They gave her a rx for repeat B-quant and f/u with [facility] in 1 week  MDM  Number of Diagnoses or Management Options  Diagnosis management comments: Possible ectopic pregnancy - Will order B-quant  Amount and/or Complexity of Data Reviewed  Clinical lab tests: ordered and reviewed      CritCare Time    Disposition  Final diagnoses:   Pregnancy of unknown anatomic location     ED Disposition     ED Disposition Condition Comment    Discharge  [address] discharge to home/self care      Condition at discharge: Good        Follow-up Information     Follow up With Specialties Details Why Madeleine 8977  Schedule an appointment as soon as possible for a visit in 1 week(s)  Stephanie [address]  [phone]          Discharge Medication List as of 10/22/2017  2:43 PM      CONTINUE these medications which have CHANGED    Details   misoprostol (CYTOTEC) 200 mcg tablet Take 2 tablets by mouth once for 1 dose, Starting Sun 10/22/2017, Print           No discharge procedures on file      ED Provider  Electronically Signed by       Jase Valero 24, DO  10/22/17 4952

## 2017-10-23 ENCOUNTER — GENERIC CONVERSION - ENCOUNTER (OUTPATIENT)
Dept: OTHER | Facility: OTHER | Age: 27
End: 2017-10-23

## 2017-11-03 ENCOUNTER — LAB CONVERSION - ENCOUNTER (OUTPATIENT)
Dept: OTHER | Facility: OTHER | Age: 27
End: 2017-11-03

## 2017-11-03 DIAGNOSIS — O02.1 MISSED ABORTION: ICD-10-CM

## 2017-11-27 ENCOUNTER — GENERIC CONVERSION - ENCOUNTER (OUTPATIENT)
Dept: OTHER | Facility: OTHER | Age: 27
End: 2017-11-27

## 2018-01-10 NOTE — MISCELLANEOUS
Message  Called pt as she Treintsammy Y Mayank 2340 to f/u appt  She "forgot"  She is to get her HCG done today and will call [facility] to reschedule  Her bleeding is now just brown discharge and her pain has resolved  Plan  Missed     · (1) HCG QUANT; Status:Active;  Requested SIY:[id number];     Signatures   Electronically signed by : SACHIN Triana ; Nov  3 2017 11:39AM EST                       (Author)

## 2018-01-12 NOTE — PROGRESS NOTES
2017         RE: David Janette                                  To: [facility]   MR#: [id number]                                     [address] [place] [address]   : [address], [address]   ENC: [phone]:[facility]                             Fax: [phone]   (Exam #: [id number])      The LMP of this 32year old,  G4, P3-0-0-3 patient was OCT 12 2016, her   working DEDE is 2017 and the current gestational age is 16 weeks 4   days by [address] [place] [address]  A sonographic examination was performed on 2017 using real time equipment  The ultrasound examination was   performed using abdominal technique  The patient has a BMI of 33 9  Her   blood pressure today was 121/65  Earliest ultrasound found in her record: 2016   9w4d  2017 DEDE   Multiple longitudinal and transverse sections revealed a twin intrauterine   pregnancy  Fetus A   Cardiac motion was not observed  Fetus B   Cardiac motion was not observed  INDICATIONS      first trimester genetic screening   obesity   prior macrosomia   diabetes, gestation-previous pregnancy   previous       Exam Types      Level I   sequential screen      MEASUREMENTS (* Included In Average GA)      FETUS A   CRL              2 5 cm        9 weeks 1 day  *      Fetus A AVERAGE G  A  is 9 weeks 1 day +/- 5 days  FETUS B   CRL              2 4 cm        9 weeks 0 days *      Fetus B AVERAGE G  A  is 9 weeks 0 days +/- 5 days        IMPRESSION      Twin IUP (Fetus A)   9 weeks and 1 day by this ultrasound  (DEDE=AUG 21 2017)   12 weeks and 4 days by 1st Tri Sono  (DEDE=2017)   Heart movement not seen      Twin IUP (Fetus B)   9 weeks and 0 days by this ultrasound  (DEDE=AUG 22 2017)   12 weeks and 4 days by 1st Tri Sono  (DEDE=2017)   Heart movement not seen      GENERAL Patellaura Suzesammy presents today for a maternal-fetal evaluation and genetic screening   on a twin pregnancy  Today's evaluation demonstrates what appears to be a   monochorionic diamniotic twin pregnancy with non-viability of both twins  She had had 2 previous early ultrasounds in the clinic, her latest of   which was on  when she was 9 weeks and 4 days  Both fetuses at   this time are measuring concordant at 9 weeks and one day and 9 week,   respectively without cardiac activity utilizing power Doppler on multiple   angles in order to confirm non-viability of this twin pregnancy  I   discussed today's findings in detail with the patient and the boyfriend  They are very appropriately upset  Dr Castro Felipe also help to coordinate the   patient's follow-up care in the clinic in order to manage this nonviable   first trimester missed   The various options include expectant   management, medical management, and surgical management  All of which are   reasonable options however there may be some increased bleeding given the   twin pregnancy  I discussed with this couple that genetics could be   offered in order to determine etiology but discussed with them that   monochorionic twins have a higher incidence of miscarriage and outside of   a genetic reason, the likely reason would be the multiple gestation  Thank you very much for allowing us to participate in the care of this   very nice patient  Should you have any questions, please do not hesitate   to contact our office  Please note, in addition to the time spent discussing the results of the   ultrasound, I spent approximately 20 minutes of face-to-face time with the   patient, greater than 50% of which was spent in counseling and the   coordination of care for this patient  GEN Rviera M D     Electronically signed 17 14:30

## 2018-01-13 VITALS
BODY MASS INDEX: 35.19 KG/M2 | SYSTOLIC BLOOD PRESSURE: 121 MMHG | WEIGHT: 232.2 LBS | DIASTOLIC BLOOD PRESSURE: 65 MMHG | HEIGHT: 68 IN

## 2018-01-13 VITALS
HEIGHT: 68 IN | HEART RATE: 100 BPM | BODY MASS INDEX: 33.65 KG/M2 | SYSTOLIC BLOOD PRESSURE: 111 MMHG | DIASTOLIC BLOOD PRESSURE: 73 MMHG | WEIGHT: 222 LBS

## 2018-01-13 NOTE — MISCELLANEOUS
Message  spoke to patient on the phone, she was given cytotec, patient to follow up in one week here  transferred to  to schedule an apt      Active Problems    1  Endometritis (615 9) (N71 9)   2  Missed  (69 849 69 22) (O02 1)   3  Postop check (V67 00) (Z09)   4  Twin pregnancy (651 00,V91 00) (O30 009)    Current Meds   1  Doxycycline Hyclate 100 MG Oral Capsule; TAKE 1 CAPSULE EVERY 12 HOURS UNTIL   GONE;   Therapy: [id number] to (Evaluate:[id number]); Last Rx:2017 Ordered   2  Prenatal Vitamins 0 8 MG Oral Tablet; TAKE 1 TABLET DAILY; Therapy: [id number] to (Alex Panda)  Requested for: [id number]; Last   Rx:[id number] Ordered   3  Tylenol TABS; Therapy: (Recorded:2017) to Recorded    Allergies    1  No Known Drug Allergies    2  Animal dander - Cats   3   No Known Food Allergies    Signatures   Electronically signed by : Renetta Santos RN; Oct 23 2017 10:13AM EST                       (Author)

## 2018-01-14 VITALS
BODY MASS INDEX: 33.65 KG/M2 | HEIGHT: 68 IN | WEIGHT: 222 LBS | SYSTOLIC BLOOD PRESSURE: 134 MMHG | DIASTOLIC BLOOD PRESSURE: 80 MMHG

## 2018-01-14 VITALS
BODY MASS INDEX: 34.65 KG/M2 | HEIGHT: 68 IN | DIASTOLIC BLOOD PRESSURE: 80 MMHG | WEIGHT: 228.6 LBS | SYSTOLIC BLOOD PRESSURE: 118 MMHG

## 2018-01-14 VITALS
SYSTOLIC BLOOD PRESSURE: 118 MMHG | WEIGHT: 225 LBS | DIASTOLIC BLOOD PRESSURE: 80 MMHG | HEIGHT: 68 IN | BODY MASS INDEX: 34.1 KG/M2

## 2018-01-23 NOTE — MISCELLANEOUS
Message  I call and spoke with patient today  patient said that she moved to [place] and so can not make her clinic visits or do her quant  However she has no current complaints, and says that she does not believe she is pregnant anymore as she has resume her "regular menses'  Patient plans to establish care in [place] and plans to request her records from the [facility]  Signatures   Electronically signed by :  SACHIN Farris ; Dec 11 2017  1:29PM EST                       (Author)

## 2018-03-07 NOTE — PROGRESS NOTES
Education  [facility] [facility] Education 1st Trimester:   First Trimester Education provided: benefits of breastfeeding, importance of exclusive breastfeeding, early initiation of breastfeeding and exclusive breastfeeding for the first 6 months   The patient is planning on breastfeeding  Prenatal education provided by: Latha Martin Date: 12/2/2016        Signatures   Electronically signed by : Latha Martin, ; Dec  2 2016 11:31AM EST                       (Author)

## 2018-03-19 ENCOUNTER — OFFICE VISIT (OUTPATIENT)
Dept: OBGYN CLINIC | Facility: CLINIC | Age: 28
End: 2018-03-19
Payer: COMMERCIAL

## 2018-03-19 VITALS — DIASTOLIC BLOOD PRESSURE: 71 MMHG | WEIGHT: 226 LBS | BODY MASS INDEX: 34.36 KG/M2 | SYSTOLIC BLOOD PRESSURE: 112 MMHG

## 2018-03-19 DIAGNOSIS — Z30.013 ENCOUNTER FOR INITIAL PRESCRIPTION OF INJECTABLE CONTRACEPTIVE: Primary | ICD-10-CM

## 2018-03-19 PROCEDURE — 99214 OFFICE O/P EST MOD 30 MIN: CPT | Performed by: NURSE PRACTITIONER

## 2018-03-19 RX ORDER — MEDROXYPROGESTERONE ACETATE 150 MG/ML
150 INJECTION, SUSPENSION INTRAMUSCULAR
Qty: 1 ML | Refills: 5 | Status: SHIPPED | OUTPATIENT
Start: 2018-03-19 | End: 2022-02-21 | Stop reason: ALTCHOICE

## 2018-03-19 NOTE — PROGRESS NOTES
Assessment/Plan:         Diagnoses and all orders for this visit:    Encounter for initial prescription of injectable contraceptive  -     medroxyPROGESTERone (DEPO-PROVERA) 150 mg/mL injection; Inject 1 mL (150 mg total) into the shoulder, thigh, or buttocks every 3 (three) months      Plan   Return for Depoprovera when appointment is avaialble  Annual exam and PAP with F/U Depoprovera          Subjective:      atient ID: Judi Stringer is a 32 y o  female  HPI  Pt states she had a miscarriage of Twins in Dec  Only had intercourse since x1 with a condom  New partner  Wants to restart Depo, was on in the past and was happy with method  Discussed irregular bleeding pattern and Calcium and vitamin D intake  Last WNL PAP was 3/11/2015         Review of Systems    Negative today  Denies pain    Objective:      /71   Wt 103 kg (226 lb)   LMP 03/14/2018 (Approximate)   BMI 34 36 kg/m²          Physical Exam    Alert and oriented  Denies pain  Plan to schedule annual GYN exam with F/U Depo

## 2018-03-20 ENCOUNTER — CLINICAL SUPPORT (OUTPATIENT)
Dept: OBGYN CLINIC | Facility: CLINIC | Age: 28
End: 2018-03-20
Payer: COMMERCIAL

## 2018-03-20 DIAGNOSIS — Z30.013 ENCOUNTER FOR INITIAL PRESCRIPTION OF INJECTABLE CONTRACEPTIVE: Primary | ICD-10-CM

## 2018-03-20 LAB — SL AMB POCT URINE HCG: NEGATIVE

## 2018-03-20 PROCEDURE — 81025 URINE PREGNANCY TEST: CPT

## 2018-03-20 PROCEDURE — 96372 THER/PROPH/DIAG INJ SC/IM: CPT

## 2018-03-20 RX ORDER — MEDROXYPROGESTERONE ACETATE 150 MG/ML
150 INJECTION, SUSPENSION INTRAMUSCULAR ONCE
Status: COMPLETED | OUTPATIENT
Start: 2018-03-20 | End: 2018-03-20

## 2018-03-20 RX ORDER — MEDROXYPROGESTERONE ACETATE 150 MG/ML
150 INJECTION, SUSPENSION INTRAMUSCULAR ONCE
Status: CANCELLED | OUTPATIENT
Start: 2018-03-20 | End: 2018-03-20

## 2018-03-20 RX ADMIN — MEDROXYPROGESTERONE ACETATE 150 MG: 150 INJECTION, SUSPENSION INTRAMUSCULAR at 14:45

## 2018-04-23 ENCOUNTER — OFFICE VISIT (OUTPATIENT)
Dept: FAMILY MEDICINE CLINIC | Facility: CLINIC | Age: 28
End: 2018-04-23
Payer: COMMERCIAL

## 2018-04-23 ENCOUNTER — DOCUMENTATION (OUTPATIENT)
Dept: FAMILY MEDICINE CLINIC | Facility: CLINIC | Age: 28
End: 2018-04-23

## 2018-04-23 VITALS
HEIGHT: 66 IN | TEMPERATURE: 98.7 F | SYSTOLIC BLOOD PRESSURE: 114 MMHG | WEIGHT: 220.25 LBS | BODY MASS INDEX: 35.4 KG/M2 | DIASTOLIC BLOOD PRESSURE: 76 MMHG | RESPIRATION RATE: 16 BRPM | HEART RATE: 92 BPM

## 2018-04-23 DIAGNOSIS — M25.572 CHRONIC PAIN OF LEFT ANKLE: ICD-10-CM

## 2018-04-23 DIAGNOSIS — G89.29 CHRONIC PAIN OF LEFT KNEE: ICD-10-CM

## 2018-04-23 DIAGNOSIS — M25.562 CHRONIC PAIN OF LEFT KNEE: ICD-10-CM

## 2018-04-23 DIAGNOSIS — G89.29 CHRONIC BILATERAL LOW BACK PAIN WITH LEFT-SIDED SCIATICA: ICD-10-CM

## 2018-04-23 DIAGNOSIS — M54.42 CHRONIC BILATERAL LOW BACK PAIN WITH LEFT-SIDED SCIATICA: ICD-10-CM

## 2018-04-23 DIAGNOSIS — F07.81 POST CONCUSSION SYNDROME: Primary | ICD-10-CM

## 2018-04-23 DIAGNOSIS — E66.09 CLASS 2 OBESITY DUE TO EXCESS CALORIES WITHOUT SERIOUS COMORBIDITY WITH BODY MASS INDEX (BMI) OF 35.0 TO 35.9 IN ADULT: ICD-10-CM

## 2018-04-23 DIAGNOSIS — G43.009 MIGRAINE WITHOUT AURA AND WITHOUT STATUS MIGRAINOSUS, NOT INTRACTABLE: ICD-10-CM

## 2018-04-23 DIAGNOSIS — G89.29 CHRONIC PAIN OF LEFT ANKLE: ICD-10-CM

## 2018-04-23 PROBLEM — N71.9 ENDOMETRITIS: Status: ACTIVE | Noted: 2017-01-23

## 2018-04-23 PROCEDURE — 3725F SCREEN DEPRESSION PERFORMED: CPT | Performed by: NURSE PRACTITIONER

## 2018-04-23 PROCEDURE — 3008F BODY MASS INDEX DOCD: CPT | Performed by: NURSE PRACTITIONER

## 2018-04-23 PROCEDURE — 99204 OFFICE O/P NEW MOD 45 MIN: CPT | Performed by: NURSE PRACTITIONER

## 2018-04-23 RX ORDER — AMITRIPTYLINE HYDROCHLORIDE 25 MG/1
25 TABLET, FILM COATED ORAL
Qty: 30 TABLET | Refills: 0 | Status: SHIPPED | OUTPATIENT
Start: 2018-04-23 | End: 2018-05-19 | Stop reason: SDUPTHER

## 2018-04-23 RX ORDER — MELOXICAM 15 MG/1
15 TABLET ORAL DAILY
Qty: 30 TABLET | Refills: 0 | Status: SHIPPED | OUTPATIENT
Start: 2018-04-23 | End: 2018-05-09 | Stop reason: ALTCHOICE
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
## 2018-04-25 ENCOUNTER — HOSPITAL ENCOUNTER (OUTPATIENT)
Dept: RADIOLOGY | Facility: HOSPITAL | Age: 28
Discharge: HOME | End: 2018-04-25
Payer: COMMERCIAL

## 2018-04-25 ENCOUNTER — APPOINTMENT (OUTPATIENT)
Dept: LAB | Facility: HOSPITAL | Age: 28
End: 2018-04-25
Payer: COMMERCIAL

## 2018-04-25 DIAGNOSIS — E66.09 CLASS 2 OBESITY DUE TO EXCESS CALORIES WITHOUT SERIOUS COMORBIDITY WITH BODY MASS INDEX (BMI) OF 35.0 TO 35.9 IN ADULT: ICD-10-CM

## 2018-04-25 DIAGNOSIS — M25.572 CHRONIC PAIN OF LEFT ANKLE: ICD-10-CM

## 2018-04-25 DIAGNOSIS — M25.562 CHRONIC PAIN OF LEFT KNEE: ICD-10-CM

## 2018-04-25 DIAGNOSIS — G43.009 MIGRAINE WITHOUT AURA AND WITHOUT STATUS MIGRAINOSUS, NOT INTRACTABLE: ICD-10-CM

## 2018-04-25 DIAGNOSIS — G89.29 CHRONIC PAIN OF LEFT ANKLE: ICD-10-CM

## 2018-04-25 DIAGNOSIS — G89.29 CHRONIC PAIN OF LEFT KNEE: ICD-10-CM

## 2018-04-25 LAB
ALBUMIN SERPL BCP-MCNC: 3.4 G/DL (ref 3.5–5)
ALP SERPL-CCNC: 70 U/L (ref 46–116)
ALT SERPL W P-5'-P-CCNC: 21 U/L (ref 12–78)
ANION GAP SERPL CALCULATED.3IONS-SCNC: 8 MMOL/L (ref 4–13)
AST SERPL W P-5'-P-CCNC: 16 U/L (ref 5–45)
BILIRUB SERPL-MCNC: 0.19 MG/DL (ref 0.2–1)
BUN SERPL-MCNC: 12 MG/DL (ref 5–25)
CALCIUM SERPL-MCNC: 9.5 MG/DL (ref 8.3–10.1)
CHLORIDE SERPL-SCNC: 107 MMOL/L (ref 100–108)
CHOLEST SERPL-MCNC: 156 MG/DL (ref 50–200)
CO2 SERPL-SCNC: 26 MMOL/L (ref 21–32)
CREAT SERPL-MCNC: 0.89 MG/DL (ref 0.6–1.3)
GFR SERPL CREATININE-BSD FRML MDRD: 89 ML/MIN/1.73SQ M
GLUCOSE P FAST SERPL-MCNC: 92 MG/DL (ref 65–99)
HDLC SERPL-MCNC: 37 MG/DL (ref 40–60)
LDLC SERPL CALC-MCNC: 108 MG/DL (ref 0–100)
NONHDLC SERPL-MCNC: 119 MG/DL
POTASSIUM SERPL-SCNC: 4.2 MMOL/L (ref 3.5–5.3)
PROT SERPL-MCNC: 8.2 G/DL (ref 6.4–8.2)
SODIUM SERPL-SCNC: 141 MMOL/L (ref 136–145)
TRIGL SERPL-MCNC: 57 MG/DL
TSH SERPL DL<=0.05 MIU/L-ACNC: 1.3 UIU/ML (ref 0.36–3.74)

## 2018-04-25 PROCEDURE — 36415 COLL VENOUS BLD VENIPUNCTURE: CPT

## 2018-04-25 PROCEDURE — 84443 ASSAY THYROID STIM HORMONE: CPT | Performed by: NURSE PRACTITIONER

## 2018-04-25 PROCEDURE — 80053 COMPREHEN METABOLIC PANEL: CPT

## 2018-04-25 PROCEDURE — 80061 LIPID PANEL: CPT | Performed by: NURSE PRACTITIONER

## 2018-04-25 PROCEDURE — 73562 X-RAY EXAM OF KNEE 3: CPT

## 2018-04-25 PROCEDURE — 73610 X-RAY EXAM OF ANKLE: CPT

## 2018-04-26 ENCOUNTER — TELEPHONE (OUTPATIENT)
Dept: FAMILY MEDICINE CLINIC | Facility: CLINIC | Age: 28
End: 2018-04-26

## 2018-04-26 NOTE — TELEPHONE ENCOUNTER
----- Message from [address] [place] [address] sent at 4/26/2018  9:44 AM EDT -----  Labs look good

## 2018-05-01 ENCOUNTER — TELEPHONE (OUTPATIENT)
Dept: FAMILY MEDICINE CLINIC | Facility: CLINIC | Age: 28
End: 2018-05-01

## 2018-05-01 NOTE — TELEPHONE ENCOUNTER
lvmtcb          ----- Message from Sanya Rodríguez, [address] sent at 5/1/2018  1:56 PM EDT -----  Xray of knee and ankle were normal  Continue with recommendations for PT and orhto eval

## 2018-05-02 ENCOUNTER — TELEPHONE (OUTPATIENT)
Dept: FAMILY MEDICINE CLINIC | Facility: CLINIC | Age: 28
End: 2018-05-02

## 2018-05-07 ENCOUNTER — TELEPHONE (OUTPATIENT)
Dept: FAMILY MEDICINE CLINIC | Facility: CLINIC | Age: 28
End: 2018-05-07

## 2018-05-07 NOTE — TELEPHONE ENCOUNTER
Pt called in following up on previous msg, left pt was notified with Xray results and recommendations

## 2018-05-08 ENCOUNTER — TELEPHONE (OUTPATIENT)
Dept: FAMILY MEDICINE CLINIC | Facility: CLINIC | Age: 28
End: 2018-05-08

## 2018-05-08 DIAGNOSIS — M25.562 CHRONIC PAIN OF LEFT KNEE: Primary | ICD-10-CM

## 2018-05-08 DIAGNOSIS — G89.29 CHRONIC PAIN OF LEFT KNEE: Primary | ICD-10-CM

## 2018-05-08 DIAGNOSIS — J30.1 SEASONAL ALLERGIC RHINITIS DUE TO POLLEN: ICD-10-CM

## 2018-05-08 DIAGNOSIS — G89.29 CHRONIC BILATERAL LOW BACK PAIN WITH LEFT-SIDED SCIATICA: ICD-10-CM

## 2018-05-08 DIAGNOSIS — M54.42 CHRONIC BILATERAL LOW BACK PAIN WITH LEFT-SIDED SCIATICA: ICD-10-CM

## 2018-05-08 NOTE — TELEPHONE ENCOUNTER
Pt called in and asked if you can send a rx in for her allergies please advise pt also did state her medication for pain is not working and she will like to know if you can send in a different rx please advise

## 2018-05-09 PROBLEM — J30.1 SEASONAL ALLERGIC RHINITIS DUE TO POLLEN: Status: ACTIVE | Noted: 2018-05-09

## 2018-05-09 RX ORDER — CELECOXIB 100 MG/1
100 CAPSULE ORAL 2 TIMES DAILY
Qty: 60 CAPSULE | Refills: 0 | Status: SHIPPED | OUTPATIENT
Start: 2018-05-09 | End: 2018-08-19 | Stop reason: SDUPTHER

## 2018-05-09 RX ORDER — LORATADINE 10 MG/1
10 TABLET ORAL DAILY
Qty: 30 TABLET | Refills: 3 | Status: SHIPPED | OUTPATIENT
Start: 2018-05-09 | End: 2022-04-15 | Stop reason: SDUPTHER

## 2018-05-09 NOTE — TELEPHONE ENCOUNTER
Yes is will be the mobic, pt will also like to know if you can send a rx over for her allergies please advise

## 2018-05-19 DIAGNOSIS — M25.562 CHRONIC PAIN OF LEFT KNEE: ICD-10-CM

## 2018-05-19 DIAGNOSIS — F07.81 POST CONCUSSION SYNDROME: ICD-10-CM

## 2018-05-19 DIAGNOSIS — G43.009 MIGRAINE WITHOUT AURA AND WITHOUT STATUS MIGRAINOSUS, NOT INTRACTABLE: ICD-10-CM

## 2018-05-19 DIAGNOSIS — G89.29 CHRONIC PAIN OF LEFT KNEE: ICD-10-CM

## 2018-05-21 RX ORDER — AMITRIPTYLINE HYDROCHLORIDE 25 MG/1
TABLET, FILM COATED ORAL
Qty: 30 TABLET | Refills: 3 | Status: SHIPPED | OUTPATIENT
Start: 2018-05-21 | End: 2022-04-15 | Stop reason: SDUPTHER

## 2018-08-19 DIAGNOSIS — M54.42 CHRONIC BILATERAL LOW BACK PAIN WITH LEFT-SIDED SCIATICA: ICD-10-CM

## 2018-08-19 DIAGNOSIS — G89.29 CHRONIC BILATERAL LOW BACK PAIN WITH LEFT-SIDED SCIATICA: ICD-10-CM

## 2018-08-19 DIAGNOSIS — M25.562 CHRONIC PAIN OF LEFT KNEE: ICD-10-CM

## 2018-08-19 DIAGNOSIS — G89.29 CHRONIC PAIN OF LEFT KNEE: ICD-10-CM

## 2018-08-19 RX ORDER — CELECOXIB 100 MG/1
CAPSULE ORAL
Qty: 60 CAPSULE | Refills: 3 | Status: SHIPPED | OUTPATIENT
Start: 2018-08-19

## 2020-12-13 ENCOUNTER — APPOINTMENT (EMERGENCY)
Dept: CT IMAGING | Facility: HOSPITAL | Age: 30
End: 2020-12-13
Payer: COMMERCIAL

## 2020-12-13 ENCOUNTER — HOSPITAL ENCOUNTER (EMERGENCY)
Facility: HOSPITAL | Age: 30
Discharge: HOME | End: 2020-12-13
Attending: EMERGENCY MEDICINE | Admitting: EMERGENCY MEDICINE
Payer: COMMERCIAL

## 2020-12-13 VITALS
HEART RATE: 64 BPM | RESPIRATION RATE: 14 BRPM | DIASTOLIC BLOOD PRESSURE: 67 MMHG | BODY MASS INDEX: 35.41 KG/M2 | WEIGHT: 219.7 LBS | SYSTOLIC BLOOD PRESSURE: 118 MMHG | OXYGEN SATURATION: 97 % | TEMPERATURE: 98.2 F

## 2020-12-13 DIAGNOSIS — R11.2 NAUSEA & VOMITING: Primary | ICD-10-CM

## 2020-12-13 DIAGNOSIS — K52.9 GASTROENTERITIS: ICD-10-CM

## 2020-12-13 DIAGNOSIS — Z20.822 SUSPECTED COVID-19 VIRUS INFECTION: ICD-10-CM

## 2020-12-13 LAB
ALBUMIN SERPL BCP-MCNC: 4.4 G/DL (ref 3–5.2)
ALP SERPL-CCNC: 81 U/L (ref 43–122)
ALT SERPL W P-5'-P-CCNC: 17 U/L (ref 9–52)
ANION GAP SERPL CALCULATED.3IONS-SCNC: 10 MMOL/L (ref 5–14)
AST SERPL W P-5'-P-CCNC: 23 U/L (ref 14–36)
BACTERIA UR QL AUTO: ABNORMAL /HPF
BASOPHILS # BLD AUTO: 0.1 THOUSANDS/ΜL (ref 0–0.1)
BASOPHILS NFR BLD AUTO: 0 % (ref 0–1)
BILIRUB SERPL-MCNC: 0.9 MG/DL
BILIRUB UR QL STRIP: NEGATIVE
BUN SERPL-MCNC: 12 MG/DL (ref 5–25)
CALCIUM SERPL-MCNC: 9.4 MG/DL (ref 8.4–10.2)
CHLORIDE SERPL-SCNC: 109 MMOL/L (ref 97–108)
CLARITY UR: ABNORMAL
CO2 SERPL-SCNC: 24 MMOL/L (ref 22–30)
COLOR UR: YELLOW
CREAT SERPL-MCNC: 0.64 MG/DL (ref 0.6–1.2)
EOSINOPHIL # BLD AUTO: 0 THOUSAND/ΜL (ref 0–0.4)
EOSINOPHIL NFR BLD AUTO: 0 % (ref 0–6)
ERYTHROCYTE [DISTWIDTH] IN BLOOD BY AUTOMATED COUNT: 13.8 %
EXT PREG TEST URINE: NEGATIVE
EXT. CONTROL ED NAV: NORMAL
GFR SERPL CREATININE-BSD FRML MDRD: 120 ML/MIN/1.73SQ M
GLUCOSE SERPL-MCNC: 93 MG/DL (ref 70–99)
GLUCOSE UR STRIP-MCNC: NEGATIVE MG/DL
HCT VFR BLD AUTO: 41.7 % (ref 36–46)
HGB BLD-MCNC: 13.5 G/DL (ref 12–16)
HGB UR QL STRIP.AUTO: 10
KETONES UR STRIP-MCNC: ABNORMAL MG/DL
LEUKOCYTE ESTERASE UR QL STRIP: 25
LIPASE SERPL-CCNC: 26 U/L (ref 23–300)
LYMPHOCYTES # BLD AUTO: 1.3 THOUSANDS/ΜL (ref 0.5–4)
LYMPHOCYTES NFR BLD AUTO: 9 % (ref 25–45)
MCH RBC QN AUTO: 29.1 PG (ref 26–34)
MCHC RBC AUTO-ENTMCNC: 32.3 G/DL (ref 31–36)
MCV RBC AUTO: 90 FL (ref 80–100)
MONOCYTES # BLD AUTO: 0.5 THOUSAND/ΜL (ref 0.2–0.9)
MONOCYTES NFR BLD AUTO: 3 % (ref 1–10)
MUCOUS THREADS UR QL AUTO: ABNORMAL
NEUTROPHILS # BLD AUTO: 12.9 THOUSANDS/ΜL (ref 1.8–7.8)
NEUTS SEG NFR BLD AUTO: 88 % (ref 45–65)
NITRITE UR QL STRIP: NEGATIVE
NON-SQ EPI CELLS URNS QL MICRO: ABNORMAL /HPF
PH UR STRIP.AUTO: 6 [PH]
PLATELET # BLD AUTO: 282 THOUSANDS/UL (ref 150–450)
PMV BLD AUTO: 8.6 FL (ref 8.9–12.7)
POTASSIUM SERPL-SCNC: 4.5 MMOL/L (ref 3.6–5)
PROT SERPL-MCNC: 8.5 G/DL (ref 5.9–8.4)
PROT UR STRIP-MCNC: ABNORMAL MG/DL
RBC # BLD AUTO: 4.63 MILLION/UL (ref 4–5.2)
RBC #/AREA URNS AUTO: ABNORMAL /HPF
SODIUM SERPL-SCNC: 143 MMOL/L (ref 137–147)
SP GR UR STRIP.AUTO: 1.02 (ref 1–1.04)
UROBILINOGEN UA: NEGATIVE MG/DL
WBC # BLD AUTO: 14.7 THOUSAND/UL (ref 4.5–11)
WBC #/AREA URNS AUTO: ABNORMAL /HPF

## 2020-12-13 PROCEDURE — 74177 CT ABD & PELVIS W/CONTRAST: CPT

## 2020-12-13 PROCEDURE — 96375 TX/PRO/DX INJ NEW DRUG ADDON: CPT

## 2020-12-13 PROCEDURE — 96374 THER/PROPH/DIAG INJ IV PUSH: CPT

## 2020-12-13 PROCEDURE — 81001 URINALYSIS AUTO W/SCOPE: CPT | Performed by: PHYSICIAN ASSISTANT

## 2020-12-13 PROCEDURE — 36415 COLL VENOUS BLD VENIPUNCTURE: CPT | Performed by: PHYSICIAN ASSISTANT

## 2020-12-13 PROCEDURE — 81025 URINE PREGNANCY TEST: CPT | Performed by: PHYSICIAN ASSISTANT

## 2020-12-13 PROCEDURE — 83690 ASSAY OF LIPASE: CPT | Performed by: PHYSICIAN ASSISTANT

## 2020-12-13 PROCEDURE — 99284 EMERGENCY DEPT VISIT MOD MDM: CPT | Performed by: PHYSICIAN ASSISTANT

## 2020-12-13 PROCEDURE — 96361 HYDRATE IV INFUSION ADD-ON: CPT

## 2020-12-13 PROCEDURE — G1004 CDSM NDSC: HCPCS

## 2020-12-13 PROCEDURE — U0003 INFECTIOUS AGENT DETECTION BY NUCLEIC ACID (DNA OR RNA); SEVERE ACUTE RESPIRATORY SYNDROME CORONAVIRUS 2 (SARS-COV-2) (CORONAVIRUS DISEASE [COVID-19]), AMPLIFIED PROBE TECHNIQUE, MAKING USE OF HIGH THROUGHPUT TECHNOLOGIES AS DESCRIBED BY CMS-2020-01-R: HCPCS | Performed by: PHYSICIAN ASSISTANT

## 2020-12-13 PROCEDURE — 80053 COMPREHEN METABOLIC PANEL: CPT | Performed by: PHYSICIAN ASSISTANT

## 2020-12-13 PROCEDURE — 81003 URINALYSIS AUTO W/O SCOPE: CPT | Performed by: PHYSICIAN ASSISTANT

## 2020-12-13 PROCEDURE — 85025 COMPLETE CBC W/AUTO DIFF WBC: CPT | Performed by: PHYSICIAN ASSISTANT

## 2020-12-13 PROCEDURE — 99284 EMERGENCY DEPT VISIT MOD MDM: CPT

## 2020-12-13 RX ORDER — DICYCLOMINE HCL 20 MG
20 TABLET ORAL 2 TIMES DAILY
Qty: 20 TABLET | Refills: 0 | Status: SHIPPED | OUTPATIENT
Start: 2020-12-13 | End: 2020-12-13 | Stop reason: SDUPTHER

## 2020-12-13 RX ORDER — ONDANSETRON 4 MG/1
4 TABLET, FILM COATED ORAL EVERY 8 HOURS PRN
Qty: 10 TABLET | Refills: 0 | Status: SHIPPED | OUTPATIENT
Start: 2020-12-13 | End: 2020-12-16

## 2020-12-13 RX ORDER — MAGNESIUM HYDROXIDE/ALUMINUM HYDROXICE/SIMETHICONE 120; 1200; 1200 MG/30ML; MG/30ML; MG/30ML
30 SUSPENSION ORAL EVERY 4 HOURS PRN
Status: DISCONTINUED | OUTPATIENT
Start: 2020-12-13 | End: 2020-12-14 | Stop reason: HOSPADM

## 2020-12-13 RX ORDER — ONDANSETRON 2 MG/ML
4 INJECTION INTRAMUSCULAR; INTRAVENOUS ONCE
Status: COMPLETED | OUTPATIENT
Start: 2020-12-13 | End: 2020-12-13

## 2020-12-13 RX ORDER — SIMETHICONE 80 MG
80 TABLET,CHEWABLE ORAL EVERY 6 HOURS PRN
Qty: 30 TABLET | Refills: 0 | Status: SHIPPED | OUTPATIENT
Start: 2020-12-13 | End: 2020-12-13 | Stop reason: SDUPTHER

## 2020-12-13 RX ORDER — ALUMINUM HYDROXIDE, MAGNESIUM HYDROXIDE, SIMETHICONE 400; 400; 40 MG/10ML; MG/10ML; MG/10ML
15 SUSPENSION ORAL
Qty: 150 ML | Refills: 0 | Status: SHIPPED | OUTPATIENT
Start: 2020-12-13

## 2020-12-13 RX ORDER — ONDANSETRON 4 MG/1
4 TABLET, FILM COATED ORAL EVERY 8 HOURS PRN
Qty: 10 TABLET | Refills: 0 | Status: SHIPPED | OUTPATIENT
Start: 2020-12-13 | End: 2020-12-13 | Stop reason: SDUPTHER

## 2020-12-13 RX ORDER — SIMETHICONE 80 MG
80 TABLET,CHEWABLE ORAL EVERY 6 HOURS PRN
Qty: 30 TABLET | Refills: 0 | Status: SHIPPED | OUTPATIENT
Start: 2020-12-13

## 2020-12-13 RX ORDER — DICYCLOMINE HCL 20 MG
20 TABLET ORAL 2 TIMES DAILY
Qty: 20 TABLET | Refills: 0 | Status: SHIPPED | OUTPATIENT
Start: 2020-12-13

## 2020-12-13 RX ORDER — ALUMINUM HYDROXIDE, MAGNESIUM HYDROXIDE, SIMETHICONE 400; 400; 40 MG/10ML; MG/10ML; MG/10ML
15 SUSPENSION ORAL
Qty: 150 ML | Refills: 0 | Status: SHIPPED | OUTPATIENT
Start: 2020-12-13 | End: 2020-12-13 | Stop reason: SDUPTHER

## 2020-12-13 RX ORDER — KETOROLAC TROMETHAMINE 30 MG/ML
15 INJECTION, SOLUTION INTRAMUSCULAR; INTRAVENOUS ONCE
Status: COMPLETED | OUTPATIENT
Start: 2020-12-13 | End: 2020-12-13

## 2020-12-13 RX ORDER — LIDOCAINE HYDROCHLORIDE 20 MG/ML
15 SOLUTION OROPHARYNGEAL ONCE
Status: COMPLETED | OUTPATIENT
Start: 2020-12-13 | End: 2020-12-13

## 2020-12-13 RX ADMIN — DIPHENHYDRAMINE HYDROCHLORIDE 25 MG: 25 LIQUID ORAL at 21:59

## 2020-12-13 RX ADMIN — LIDOCAINE HYDROCHLORIDE 15 ML: 20 SOLUTION ORAL; TOPICAL at 21:59

## 2020-12-13 RX ADMIN — SODIUM CHLORIDE 1000 ML: 0.9 INJECTION, SOLUTION INTRAVENOUS at 20:25

## 2020-12-13 RX ADMIN — ONDANSETRON 4 MG: 2 INJECTION INTRAMUSCULAR; INTRAVENOUS at 20:29

## 2020-12-13 RX ADMIN — ALUMINUM HYDROXIDE, MAGNESIUM HYDROXIDE, AND SIMETHICONE 30 ML: 200; 200; 20 SUSPENSION ORAL at 21:59

## 2020-12-13 RX ADMIN — IOHEXOL 100 ML: 350 INJECTION, SOLUTION INTRAVENOUS at 21:30

## 2020-12-13 RX ADMIN — KETOROLAC TROMETHAMINE 15 MG: 30 INJECTION, SOLUTION INTRAMUSCULAR; INTRAVENOUS at 20:27

## 2020-12-15 LAB — SARS-COV-2 RNA SPEC QL NAA+PROBE: NOT DETECTED

## 2021-12-14 ENCOUNTER — HOSPITAL ENCOUNTER (EMERGENCY)
Facility: HOSPITAL | Age: 31
Discharge: HOME | End: 2021-12-14
Attending: EMERGENCY MEDICINE
Payer: COMMERCIAL

## 2021-12-14 ENCOUNTER — APPOINTMENT (EMERGENCY)
Dept: RADIOLOGY | Facility: HOSPITAL | Age: 31
End: 2021-12-14
Payer: COMMERCIAL

## 2021-12-14 VITALS
SYSTOLIC BLOOD PRESSURE: 121 MMHG | HEART RATE: 108 BPM | BODY MASS INDEX: 40.36 KG/M2 | DIASTOLIC BLOOD PRESSURE: 71 MMHG | TEMPERATURE: 99.1 F | WEIGHT: 250.44 LBS | OXYGEN SATURATION: 98 % | RESPIRATION RATE: 20 BRPM

## 2021-12-14 DIAGNOSIS — J06.9 VIRAL URI WITH COUGH: Primary | ICD-10-CM

## 2021-12-14 PROCEDURE — 99285 EMERGENCY DEPT VISIT HI MDM: CPT | Performed by: EMERGENCY MEDICINE

## 2021-12-14 PROCEDURE — 94640 AIRWAY INHALATION TREATMENT: CPT

## 2021-12-14 PROCEDURE — 93005 ELECTROCARDIOGRAM TRACING: CPT

## 2021-12-14 PROCEDURE — 96372 THER/PROPH/DIAG INJ SC/IM: CPT

## 2021-12-14 PROCEDURE — 71046 X-RAY EXAM CHEST 2 VIEWS: CPT

## 2021-12-14 PROCEDURE — 99284 EMERGENCY DEPT VISIT MOD MDM: CPT

## 2021-12-14 RX ORDER — ALBUTEROL SULFATE 90 UG/1
1-2 AEROSOL, METERED RESPIRATORY (INHALATION) EVERY 6 HOURS PRN
Qty: 6.7 G | Refills: 0 | Status: SHIPPED | OUTPATIENT
Start: 2021-12-14

## 2021-12-14 RX ORDER — BENZONATATE 100 MG/1
100 CAPSULE ORAL EVERY 8 HOURS
Qty: 21 CAPSULE | Refills: 0 | Status: SHIPPED | OUTPATIENT
Start: 2021-12-14 | End: 2021-12-14 | Stop reason: SDUPTHER

## 2021-12-14 RX ORDER — ALBUTEROL SULFATE 90 UG/1
1-2 AEROSOL, METERED RESPIRATORY (INHALATION) EVERY 6 HOURS PRN
Qty: 6.7 G | Refills: 0 | Status: SHIPPED | OUTPATIENT
Start: 2021-12-14 | End: 2021-12-14 | Stop reason: SDUPTHER

## 2021-12-14 RX ORDER — KETOROLAC TROMETHAMINE 30 MG/ML
30 INJECTION, SOLUTION INTRAMUSCULAR; INTRAVENOUS ONCE
Status: COMPLETED | OUTPATIENT
Start: 2021-12-14 | End: 2021-12-14

## 2021-12-14 RX ORDER — ALBUTEROL SULFATE 2.5 MG/3ML
2.5 SOLUTION RESPIRATORY (INHALATION) ONCE
Status: COMPLETED | OUTPATIENT
Start: 2021-12-14 | End: 2021-12-14

## 2021-12-14 RX ORDER — NAPROXEN 500 MG/1
500 TABLET ORAL 2 TIMES DAILY WITH MEALS
Qty: 30 TABLET | Refills: 0 | Status: SHIPPED | OUTPATIENT
Start: 2021-12-14

## 2021-12-14 RX ORDER — BENZONATATE 100 MG/1
100 CAPSULE ORAL EVERY 8 HOURS
Qty: 21 CAPSULE | Refills: 0 | Status: SHIPPED | OUTPATIENT
Start: 2021-12-14

## 2021-12-14 RX ORDER — NAPROXEN 500 MG/1
500 TABLET ORAL 2 TIMES DAILY WITH MEALS
Qty: 30 TABLET | Refills: 0 | Status: SHIPPED | OUTPATIENT
Start: 2021-12-14 | End: 2021-12-14 | Stop reason: SDUPTHER

## 2021-12-14 RX ORDER — IPRATROPIUM BROMIDE AND ALBUTEROL SULFATE 2.5; .5 MG/3ML; MG/3ML
3 SOLUTION RESPIRATORY (INHALATION) ONCE
Status: COMPLETED | OUTPATIENT
Start: 2021-12-14 | End: 2021-12-14

## 2021-12-14 RX ADMIN — KETOROLAC TROMETHAMINE 30 MG: 30 INJECTION, SOLUTION INTRAMUSCULAR; INTRAVENOUS at 22:07

## 2021-12-14 RX ADMIN — ALBUTEROL SULFATE 2.5 MG: 2.5 SOLUTION RESPIRATORY (INHALATION) at 22:05

## 2021-12-14 RX ADMIN — IPRATROPIUM BROMIDE AND ALBUTEROL SULFATE 3 ML: 2.5; .5 SOLUTION RESPIRATORY (INHALATION) at 22:57

## 2021-12-15 LAB
ATRIAL RATE: 80 BPM
P AXIS: 58 DEGREES
PR INTERVAL: 148 MS
QRS AXIS: 41 DEGREES
QRSD INTERVAL: 82 MS
QT INTERVAL: 344 MS
QTC INTERVAL: 396 MS
T WAVE AXIS: 17 DEGREES
VENTRICULAR RATE: 80 BPM

## 2021-12-15 PROCEDURE — 93010 ELECTROCARDIOGRAM REPORT: CPT | Performed by: INTERNAL MEDICINE

## 2022-01-06 ENCOUNTER — HOSPITAL ENCOUNTER (EMERGENCY)
Facility: HOSPITAL | Age: 32
Discharge: HOME | End: 2022-01-06
Attending: EMERGENCY MEDICINE | Admitting: EMERGENCY MEDICINE
Payer: COMMERCIAL

## 2022-01-06 VITALS
DIASTOLIC BLOOD PRESSURE: 84 MMHG | BODY MASS INDEX: 40.68 KG/M2 | RESPIRATION RATE: 16 BRPM | SYSTOLIC BLOOD PRESSURE: 132 MMHG | WEIGHT: 252.4 LBS | TEMPERATURE: 99 F | HEART RATE: 97 BPM | OXYGEN SATURATION: 98 %

## 2022-01-06 DIAGNOSIS — R11.10 VOMITING: Primary | ICD-10-CM

## 2022-01-06 PROCEDURE — U0005 INFEC AGEN DETEC AMPLI PROBE: HCPCS | Performed by: EMERGENCY MEDICINE

## 2022-01-06 PROCEDURE — U0003 INFECTIOUS AGENT DETECTION BY NUCLEIC ACID (DNA OR RNA); SEVERE ACUTE RESPIRATORY SYNDROME CORONAVIRUS 2 (SARS-COV-2) (CORONAVIRUS DISEASE [COVID-19]), AMPLIFIED PROBE TECHNIQUE, MAKING USE OF HIGH THROUGHPUT TECHNOLOGIES AS DESCRIBED BY CMS-2020-01-R: HCPCS | Performed by: EMERGENCY MEDICINE

## 2022-01-06 PROCEDURE — 99283 EMERGENCY DEPT VISIT LOW MDM: CPT

## 2022-01-06 PROCEDURE — 99284 EMERGENCY DEPT VISIT MOD MDM: CPT | Performed by: EMERGENCY MEDICINE

## 2022-01-06 RX ORDER — ONDANSETRON 4 MG/1
4 TABLET, ORALLY DISINTEGRATING ORAL EVERY 8 HOURS PRN
Qty: 20 TABLET | Refills: 0 | Status: SHIPPED | OUTPATIENT
Start: 2022-01-06

## 2022-01-06 NOTE — Clinical Note
Brandon Mendiola was seen and treated in our emergency department on 1/6/2022  Diagnosis:     Deborah    She may return on this date:     If the COVID test is negative may return to school/work 72 hours after symptoms have resolved    If the COVID test is positive may return to school/work 10 days after onset of symptoms also must be asymptomatic for 72 hours       If you have any questions or concerns, please don't hesitate to call        Allyssa Mathis, DO    ______________________________           _______________          _______________  Hospital Representative                              Date                                Time

## 2022-01-06 NOTE — ED PROVIDER NOTES
HPI: Patient is a 32 y o  female who presents with 3 days of nausea and vomiting which the patient describes at mild The patient has had contact with people with similar symptoms  The patient has not taken any medication  Allergies   Allergen Reactions    Cat Hair Extract Itching and Sneezing    Other Itching and Sneezing     SEASONAL       Past Medical History:   Diagnosis Date    Missed      twin pregnancy      Past Surgical History:   Procedure Laterality Date     SECTION      last assessed 14    DILATION AND EVACUATION N/A 2017    Procedure: DILATATION AND EVACUATION (D&E); Surgeon: Tanisha Lane MD;  Location: [facility];  Service:      Social History     Tobacco Use    Smoking status: Former Smoker     Types: Cigarettes    Smokeless tobacco: Never Used    Tobacco comment: current everyday per [employer]   Vaping Use    Vaping Use: Never used   Substance Use Topics    Alcohol use: Not Currently     Comment: Pt  states she drinks 1 drink/week    Drug use: Yes     Types: Marijuana     Comment: every day       Nursing notes reviewed  Physical Exam:  ED Triage Vitals [22 1110]   Temperature Pulse Respirations Blood Pressure SpO2   99 °F (37 2 °C) 97 16 132/84 98 %      Temp Source Heart Rate Source Patient Position - Orthostatic VS BP Location FiO2 (%)   Oral Monitor Sitting Left arm --      Pain Score       10 - Worst Possible Pain           ROS: Positive for nausea and vomiting ,the remainder of a 10 organ system ROS was otherwise unremarkable    General: awake, alert, no acute distress    Head: normocephalic, atraumatic    Eyes: no scleral icterus  Ears: external ears normal, hearing grossly intact  Nose: external exam grossly normal, negative nasal discharge  Neck: symmetric, No JVD noted, trachea midline  Pulmonary: no respiratory distress, no tachypnea noted  Cardiovascular: appears well perfused  Abdomen: no distention noted  Musculoskeletal: no deformities noted, tone normal  Neuro: grossly non-focal  Psych: mood and affect appropriate    The patient is stable and has a history and physical exam consistent with a viral illness  COVID19 testing has been performed  I considered the patient's other medical conditions as applicable/noted above in my medical decision making  The patient is stable upon discharge  The plan is for supportive care at home  The patient (and any family present) verbalized understanding of the discharge instructions and warnings that would necessitate return to the Emergency Department  All questions were answered prior to discharge  Pt re-examined and evaluated after testing and treatment  Spoke with the patient and feeling improved and sxs have resolved  Will discharge home with close f/u with pcp and instructed to return to the ED if sxs worsen or continue  Pt agrees with the plan for discharge and feels comfortable to go home with proper f/u  Advised to return for worsening or additional problems  Diagnostic tests were reviewed and questions answered  Diagnosis, care plan and treatment options were discussed  The patient understand instructions and will follow up as directed  Counseling: I had a detailed discussion with the patient and/or guardian regarding: the historical points, exam findings, and any diagnostic results supporting the discharge diagnosis, lab results, radiology results, discharge instructions reviewed with patient and/or family/caregiver and understanding was verbalized  Instructions given to return to the emergency department if symptoms worsen or persist, or if there are any questions or concerns that arise at home       All labs reviewed and utilized in the medical decision making process    All radiology studies independently viewed by me and interpreted by the radiologist       Medications - No data to display  Final diagnoses:   Vomiting     Time reflects when diagnosis was documented in both MDM as applicable and the Disposition within this note     Time User Action Codes Description Comment    1/6/2022 11:14 AM Laurie Chaudhry Add [R11 10] Vomiting       ED Disposition     ED Disposition Condition Date/Time Comment    Discharge Stable Thu Jan 6, 2022 [address] discharge to home/self care              Follow-up Information    None       Patient's Medications   Discharge Prescriptions    ONDANSETRON (ZOFRAN-ODT) 4 MG DISINTEGRATING TABLET    Take 1 tablet (4 mg total) by mouth every 8 (eight) hours as needed for nausea       Start Date: 1/6/2022  End Date: --       Order Dose: 4 mg       Quantity: 20 tablet    Refills: 0         Electronically Signed by        Bradley Heck DO  01/06/22 1125

## 2022-01-08 LAB — SARS-COV-2 RNA RESP QL NAA+PROBE: POSITIVE

## 2022-02-01 ENCOUNTER — OFFICE VISIT (OUTPATIENT)
Dept: DENTISTRY | Facility: CLINIC | Age: 32
End: 2022-02-01

## 2022-02-01 VITALS — TEMPERATURE: 96.9 F | HEART RATE: 76 BPM | SYSTOLIC BLOOD PRESSURE: 118 MMHG | DIASTOLIC BLOOD PRESSURE: 79 MMHG

## 2022-02-01 DIAGNOSIS — Z01.20 ENCOUNTER FOR DENTAL EXAMINATION: Primary | ICD-10-CM

## 2022-02-01 PROCEDURE — D0220 INTRAORAL - PERIAPICAL FIRST RADIOGRAPHIC IMAGE: HCPCS

## 2022-02-01 PROCEDURE — D0140 LIMITED ORAL EVALUATION - PROBLEM FOCUSED: HCPCS | Performed by: DENTIST
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
## 2022-02-02 ENCOUNTER — OFFICE VISIT (OUTPATIENT)
Dept: DENTISTRY | Facility: CLINIC | Age: 32
End: 2022-02-02

## 2022-02-02 VITALS — TEMPERATURE: 96.7 F | DIASTOLIC BLOOD PRESSURE: 81 MMHG | SYSTOLIC BLOOD PRESSURE: 123 MMHG | HEART RATE: 93 BPM

## 2022-02-02 DIAGNOSIS — K02.9 CARIES: Primary | ICD-10-CM

## 2022-02-02 PROCEDURE — D2391 RESIN-BASED COMPOSITE - 1 SURFACE, POSTERIOR: HCPCS | Performed by: DENTIST

## 2022-02-02 NOTE — PROGRESS NOTES
Composite Filling    Rossana De La Cruz presents for composite filling  PMH reviewed, no changes  Applied topical benzocaine, administered 2 carps 4% articaine 1:100k epi via local infiltration  Prepped tooth #2 O, 15 O with 245 carbide on high speed  Caries removed with round carbide on slow speed  Isolation with cotton rolls and dri-angles    Limelite applied to pulpal floor of #2  Etch with 37% H2PO4, rinse, dry  Applied Adhese with 20 second scrub once, gentle air dry and light cured for 10s  Restored with Tetric flowable and packable  shade A2 and light cured  Refined with finishing burs, polished with enhance point  Verified occlusion  Pt left satisfied      Nv: Preliminary impressions for upper immediate RPD  Ww: Dr Soto [place]

## 2022-02-21 ENCOUNTER — OFFICE VISIT (OUTPATIENT)
Dept: FAMILY MEDICINE CLINIC | Facility: CLINIC | Age: 32
End: 2022-02-21

## 2022-02-21 VITALS
HEART RATE: 109 BPM | BODY MASS INDEX: 36.83 KG/M2 | DIASTOLIC BLOOD PRESSURE: 70 MMHG | HEIGHT: 68 IN | WEIGHT: 243 LBS | OXYGEN SATURATION: 98 % | SYSTOLIC BLOOD PRESSURE: 110 MMHG | RESPIRATION RATE: 16 BRPM | TEMPERATURE: 97.9 F

## 2022-02-21 DIAGNOSIS — Z11.59 NEED FOR HEPATITIS C SCREENING TEST: ICD-10-CM

## 2022-02-21 DIAGNOSIS — G43.009 MIGRAINE WITHOUT AURA AND WITHOUT STATUS MIGRAINOSUS, NOT INTRACTABLE: ICD-10-CM

## 2022-02-21 DIAGNOSIS — M25.572 CHRONIC PAIN OF LEFT ANKLE: ICD-10-CM

## 2022-02-21 DIAGNOSIS — Z76.89 ENCOUNTER TO ESTABLISH CARE: Primary | ICD-10-CM

## 2022-02-21 DIAGNOSIS — G89.29 CHRONIC PAIN OF LEFT ANKLE: ICD-10-CM

## 2022-02-21 DIAGNOSIS — E66.9 CLASS 2 OBESITY WITH BODY MASS INDEX (BMI) OF 37.0 TO 37.9 IN ADULT, UNSPECIFIED OBESITY TYPE, UNSPECIFIED WHETHER SERIOUS COMORBIDITY PRESENT: ICD-10-CM

## 2022-02-21 DIAGNOSIS — Z11.4 SCREENING FOR HIV (HUMAN IMMUNODEFICIENCY VIRUS): ICD-10-CM

## 2022-02-21 DIAGNOSIS — Z02.4 DRIVER'S PERMIT PE (PHYSICAL EXAMINATION): ICD-10-CM

## 2022-02-21 DIAGNOSIS — G89.29 CHRONIC PAIN OF LEFT KNEE: ICD-10-CM

## 2022-02-21 DIAGNOSIS — M25.562 CHRONIC PAIN OF LEFT KNEE: ICD-10-CM

## 2022-02-21 PROBLEM — F07.81 POST CONCUSSION SYNDROME: Status: RESOLVED | Noted: 2018-04-23 | Resolved: 2022-02-21

## 2022-02-21 PROCEDURE — 99203 OFFICE O/P NEW LOW 30 MIN: CPT | Performed by: FAMILY MEDICINE

## 2022-02-21 NOTE — LETTER
February 21, 2022     Patient: Christopher Patton   YOB: 1990   Date of Visit: 2/21/2022       To Whom it May Concern:    Jeny Collins is under my professional care  She was seen in my office on 2/21/2022  Please excuse her for being late to work today  She may return to work on 2/21/2022  If you have any questions or concerns, please don't hesitate to call           Sincerely,          Leanne Hunter MD        CC: No Recipients
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
## 2022-02-22 NOTE — ASSESSMENT & PLAN NOTE
Follows with Neurology, currently managed with amitriptyline 25 mg daily, and rib of flap in 100 mg daily, will continue to follow along

## 2022-04-15 DIAGNOSIS — M25.562 CHRONIC PAIN OF LEFT KNEE: ICD-10-CM

## 2022-04-15 DIAGNOSIS — F07.81 POST CONCUSSION SYNDROME: ICD-10-CM

## 2022-04-15 DIAGNOSIS — G43.009 MIGRAINE WITHOUT AURA AND WITHOUT STATUS MIGRAINOSUS, NOT INTRACTABLE: ICD-10-CM

## 2022-04-15 DIAGNOSIS — G89.29 CHRONIC PAIN OF LEFT KNEE: ICD-10-CM

## 2022-04-15 DIAGNOSIS — J30.1 SEASONAL ALLERGIC RHINITIS DUE TO POLLEN: ICD-10-CM

## 2022-04-15 NOTE — TELEPHONE ENCOUNTER
Pt is in need of refills  I asked pt if she follows with a neurologist pt states she has never seen a neurologist she was getting the migraine medication from her former PCP   Please advise thank you

## 2022-04-18 RX ORDER — AMITRIPTYLINE HYDROCHLORIDE 25 MG/1
25 TABLET, FILM COATED ORAL
Qty: 30 TABLET | Refills: 3 | Status: SHIPPED | OUTPATIENT
Start: 2022-04-18

## 2022-04-18 RX ORDER — LORATADINE 10 MG/1
10 TABLET ORAL DAILY
Qty: 30 TABLET | Refills: 3 | Status: SHIPPED | OUTPATIENT
Start: 2022-04-18

## 2022-04-25 ENCOUNTER — VBI (OUTPATIENT)
Dept: ADMINISTRATIVE | Facility: OTHER | Age: 32
End: 2022-04-25

## 2022-05-13 DIAGNOSIS — G43.009 MIGRAINE WITHOUT AURA AND WITHOUT STATUS MIGRAINOSUS, NOT INTRACTABLE: ICD-10-CM

## 2022-08-10 ENCOUNTER — OFFICE VISIT (OUTPATIENT)
Dept: DENTISTRY | Facility: CLINIC | Age: 32
End: 2022-08-10

## 2022-08-10 VITALS — HEART RATE: 103 BPM | TEMPERATURE: 98.7 F | SYSTOLIC BLOOD PRESSURE: 118 MMHG | DIASTOLIC BLOOD PRESSURE: 76 MMHG

## 2022-08-10 DIAGNOSIS — K04.7 DENTAL ABSCESS: Primary | ICD-10-CM

## 2022-08-10 RX ORDER — AMOXICILLIN 500 MG/1
500 CAPSULE ORAL EVERY 8 HOURS SCHEDULED
Qty: 21 CAPSULE | Refills: 0 | Status: SHIPPED | OUTPATIENT
Start: 2022-08-10 | End: 2022-08-17

## 2022-08-10 NOTE — PROGRESS NOTES
Candelaria Rodriguez came with c/o pian and swelling in relation to ul side since a few days  Med hx rvd: ASA II  Pain level: 4  Pt states she may be pregnant, not sure  O/e:  E/o: Pt has mild fluctuant swelling in reln to ul side of the face, pt afebrile,   I/O:  #14 rctreated, with p/c, with draining abscess, tender on palpation, no mobility  Poor prognosis  Pt adv ext, ref to OS given   Pt also given antib and plain tylenol (incase pregnant)  Pt adv to inform obgyn  Pt has concerns about #9,10 , planned for extrns    Pt adv to return for follow up for evaln and tr for upper immed denture  Pt left the office comf, and ambulatory

## 2022-10-11 PROBLEM — Z02.4 DRIVER'S PERMIT PE (PHYSICAL EXAMINATION): Status: RESOLVED | Noted: 2022-02-21 | Resolved: 2022-10-11

## 2023-01-17 DIAGNOSIS — J30.1 SEASONAL ALLERGIC RHINITIS DUE TO POLLEN: ICD-10-CM

## 2023-01-18 RX ORDER — LORATADINE 10 MG/1
10 TABLET ORAL DAILY
Qty: 30 TABLET | Refills: 0 | Status: SHIPPED | OUTPATIENT
Start: 2023-01-18

## 2023-02-12 DIAGNOSIS — J30.1 SEASONAL ALLERGIC RHINITIS DUE TO POLLEN: ICD-10-CM

## 2023-02-14 RX ORDER — LORATADINE 10 MG/1
10 TABLET ORAL DAILY
Qty: 30 TABLET | Refills: 0 | Status: SHIPPED | OUTPATIENT
Start: 2023-02-14

## 2023-03-22 DIAGNOSIS — G89.29 CHRONIC PAIN OF LEFT KNEE: ICD-10-CM

## 2023-03-22 DIAGNOSIS — F07.81 POST CONCUSSION SYNDROME: ICD-10-CM

## 2023-03-22 DIAGNOSIS — M25.562 CHRONIC PAIN OF LEFT KNEE: ICD-10-CM

## 2023-03-22 DIAGNOSIS — G43.009 MIGRAINE WITHOUT AURA AND WITHOUT STATUS MIGRAINOSUS, NOT INTRACTABLE: ICD-10-CM

## 2023-03-23 RX ORDER — AMITRIPTYLINE HYDROCHLORIDE 25 MG/1
25 TABLET, FILM COATED ORAL
Qty: 30 TABLET | Refills: 3 | OUTPATIENT
Start: 2023-03-23

## 2023-03-28 DIAGNOSIS — J30.1 SEASONAL ALLERGIC RHINITIS DUE TO POLLEN: ICD-10-CM

## 2023-03-29 RX ORDER — LORATADINE 10 MG/1
10 TABLET ORAL DAILY
Qty: 30 TABLET | Refills: 0 | OUTPATIENT
Start: 2023-03-29

## 2023-05-18 DIAGNOSIS — J30.1 SEASONAL ALLERGIC RHINITIS DUE TO POLLEN: ICD-10-CM

## 2023-05-19 RX ORDER — LORATADINE 10 MG/1
10 TABLET ORAL DAILY
Qty: 30 TABLET | Refills: 0 | Status: SHIPPED | OUTPATIENT
Start: 2023-05-19

## (undated) DEVICE — CURETTE VACURETTE CRVD 12MM

## (undated) DEVICE — PVC URETHRAL CATHETER: Brand: DOVER

## (undated) DEVICE — 3000CC GUARDIAN II: Brand: GUARDIAN

## (undated) DEVICE — GLOVE SRG BIOGEL ECLIPSE 6.5

## (undated) DEVICE — SCD SEQUENTIAL COMPRESSION COMFORT SLEEVE MEDIUM KNEE LENGTH: Brand: KENDALL SCD

## (undated) DEVICE — STRL UNIVERSAL MINOR VAGINAL: Brand: CARDINAL HEALTH

## (undated) DEVICE — GLOVE INDICATOR PI UNDERGLOVE SZ 6.5 BLUE